# Patient Record
Sex: FEMALE | Race: WHITE | NOT HISPANIC OR LATINO | Employment: OTHER | URBAN - METROPOLITAN AREA
[De-identification: names, ages, dates, MRNs, and addresses within clinical notes are randomized per-mention and may not be internally consistent; named-entity substitution may affect disease eponyms.]

---

## 2017-01-19 ENCOUNTER — OFFICE VISIT (OUTPATIENT)
Dept: NEUROSURGERY | Facility: CLINIC | Age: 42
End: 2017-01-19

## 2017-01-19 VITALS
HEIGHT: 67 IN | TEMPERATURE: 98.2 F | BODY MASS INDEX: 32.68 KG/M2 | WEIGHT: 208.2 LBS | SYSTOLIC BLOOD PRESSURE: 120 MMHG | DIASTOLIC BLOOD PRESSURE: 76 MMHG

## 2017-01-19 DIAGNOSIS — M51.36 DEGENERATIVE DISC DISEASE, LUMBAR: Primary | ICD-10-CM

## 2017-01-19 DIAGNOSIS — M51.36 BULGE OF LUMBAR DISC WITHOUT MYELOPATHY: ICD-10-CM

## 2017-01-19 PROCEDURE — 99203 OFFICE O/P NEW LOW 30 MIN: CPT | Performed by: NEUROLOGICAL SURGERY

## 2017-01-19 RX ORDER — LOSARTAN POTASSIUM 100 MG/1
100 TABLET ORAL DAILY
COMMUNITY

## 2017-01-19 RX ORDER — GABAPENTIN 800 MG/1
800 TABLET ORAL 4 TIMES DAILY
COMMUNITY

## 2017-01-19 RX ORDER — PHENTERMINE HYDROCHLORIDE 37.5 MG/1
37.5 CAPSULE ORAL EVERY MORNING
COMMUNITY
End: 2019-01-21 | Stop reason: ALTCHOICE

## 2017-01-19 RX ORDER — BUPRENORPHINE HYDROCHLORIDE AND NALOXONE HYDROCHLORIDE DIHYDRATE 8; 2 MG/1; MG/1
1 TABLET SUBLINGUAL DAILY
COMMUNITY

## 2017-01-19 RX ORDER — CITALOPRAM 40 MG/1
40 TABLET ORAL DAILY
COMMUNITY
End: 2019-01-21 | Stop reason: ALTCHOICE

## 2017-01-19 RX ORDER — METHOCARBAMOL 750 MG/1
750 TABLET, FILM COATED ORAL 3 TIMES DAILY
Qty: 90 TABLET | Refills: 0 | Status: SHIPPED | OUTPATIENT
Start: 2017-01-19 | End: 2017-02-18

## 2017-01-19 RX ORDER — PROMETHAZINE HYDROCHLORIDE 25 MG/1
25 TABLET ORAL EVERY 6 HOURS PRN
COMMUNITY

## 2017-01-19 RX ORDER — OMEPRAZOLE 40 MG/1
40 CAPSULE, DELAYED RELEASE ORAL 2 TIMES DAILY
COMMUNITY
End: 2021-02-19

## 2017-01-19 RX ORDER — POLYETHYLENE GLYCOL 3350 17 G/17G
17 POWDER, FOR SOLUTION ORAL DAILY
COMMUNITY

## 2017-01-19 RX ORDER — METOCLOPRAMIDE 10 MG/1
10 TABLET ORAL
COMMUNITY

## 2017-01-19 RX ORDER — LORATADINE 10 MG/1
CAPSULE, LIQUID FILLED ORAL
COMMUNITY
End: 2019-01-21 | Stop reason: ALTCHOICE

## 2017-01-19 RX ORDER — SUMATRIPTAN 100 MG/1
100 TABLET, FILM COATED ORAL ONCE AS NEEDED
COMMUNITY

## 2017-01-19 NOTE — LETTER
2017     Demario Guajardo MD  [address] [place] [address]    Patient: Radha Rahman   YOB: 1975   Date of Visit: 2017       Dear Dr. Casandra MD:    Radha Rahman was in my office today. Below is a copy of my note.    If you have questions, please do not hesitate to call me. I look forward to following Radha along with you.         Sincerely,        Isidoro Hagen MD        CC: MD Elan Nuñez APRN Oliver C. James II, MD Ronald Keith Belhasen, MD Rebkami Galanr  1975  [id number]      Chief Complaint   Patient presents with   • Back Pain       HISTORY OF PRESENT ILLNESS:  [This is a 41-year-old female with a long history of low back pain.  The pain is located across the sacroiliac area.  She has some radiculopathy however it is ill-defined nondermatomal and not that usually associated with nerve root or spinal cord compression.  She had a fracture of her left ankle many years ago and has a abnormal gait which clearly is transmitted to the lumbar spine.  In the past she has been to pain management with epidural steroid injections which helped.  She has not had that for some time until she regained her insurance under the Affordable Care Act.    MRI was performed and she is referred for neurosurgical consultation. ]    Past Medical History   Diagnosis Date   • Arthritis    • Wade syndrome    • Bladder infection    • Bronchitis    • Emphysema lung    • GERD (gastroesophageal reflux disease)    • Hernia    • Hypertension    • Infectious viral hepatitis    • Ulcer        Past Surgical History   Procedure Laterality Date   •  section     • Gallbladder surgery     • Ercp         Family History   Problem Relation Age of Onset   • Cancer Mother    • Arthritis Mother    • Heart disease Father    • Cancer Father        Social History     Social History   • Marital status: Unknown     Spouse name: N/A   • Number of children:  N/A   • Years of education: N/A     Occupational History   • Not on file.     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    • Sexual activity: Defer     Other Topics Concern   • Not on file     Social History Narrative   • No narrative on file       Allergies   Allergen Reactions   • Bactrim [Sulfamethoxazole-Trimethoprim]    • Stadol [Butorphanol]          Current Outpatient Prescriptions:   •  buprenorphine-naloxone (SUBOXONE) 8-2 MG per SL tablet, Place 1 tablet under the tongue Daily., Disp: , Rfl:   •  citalopram (CeleXA) 40 MG tablet, Take 40 mg by mouth Daily., Disp: , Rfl:   •  CLINDAMYCIN HCL PO, Take  by mouth., Disp: , Rfl:   •  diclofenac (VOLTAREN) 1 % gel gel, Apply 4 g topically 4 (Four) Times a Day As Needed., Disp: , Rfl:   •  gabapentin (NEURONTIN) 800 MG tablet, Take 800 mg by mouth 4 (Four) Times a Day., Disp: , Rfl:   •  Loratadine 10 MG capsule, Take  by mouth., Disp: , Rfl:   •  losartan (COZAAR) 100 MG tablet, Take 100 mg by mouth Daily., Disp: , Rfl:   •  metoclopramide (REGLAN) 10 MG tablet, Take 10 mg by mouth 4 (Four) Times a Day Before Meals & at Bedtime., Disp: , Rfl:   •  omeprazole (priLOSEC) 40 MG capsule, Take 40 mg by mouth 2 (Two) Times a Day., Disp: , Rfl:   •  phentermine 37.5 MG capsule, Take 37.5 mg by mouth Every Morning., Disp: , Rfl:   •  polyethylene glycol (MIRALAX) packet, Take 17 g by mouth Daily., Disp: , Rfl:   •  promethazine (PHENERGAN) 25 MG tablet, Take 25 mg by mouth Every 6 (Six) Hours As Needed for nausea or vomiting., Disp: , Rfl:   •  SUMAtriptan (IMITREX) 100 MG tablet, Take 100 mg by mouth 1 (One) Time As Needed for migraine., Disp: , Rfl:     Review of Systems   Constitutional: Positive for activity change, fatigue and unexpected weight change. Negative for  appetite change, chills, diaphoresis and fever.   HENT: Positive for congestion, ear pain, postnasal drip, rhinorrhea, sinus pressure and trouble swallowing. Negative for dental problem, drooling, ear discharge, facial swelling, hearing loss, mouth sores, nosebleeds, sneezing, sore throat, tinnitus and voice change.    Eyes: Positive for photophobia, itching and visual disturbance. Negative for pain, discharge and redness.   Respiratory: Positive for cough, chest tightness and shortness of breath. Negative for apnea, choking, wheezing and stridor.    Cardiovascular: Positive for chest pain, palpitations and leg swelling.   Gastrointestinal: Positive for abdominal distention, abdominal pain, anal bleeding, blood in stool, constipation, diarrhea, nausea, rectal pain and vomiting.   Endocrine: Positive for cold intolerance. Negative for heat intolerance, polydipsia, polyphagia and polyuria.   Genitourinary: Positive for dyspareunia, flank pain, menstrual problem and pelvic pain. Negative for decreased urine volume, difficulty urinating, dysuria, enuresis, frequency, genital sores, hematuria and urgency.   Musculoskeletal: Positive for arthralgias, back pain, gait problem, joint swelling, myalgias, neck pain and neck stiffness.   Skin: Negative for color change, pallor, rash and wound.   Allergic/Immunologic: Negative for environmental allergies, food allergies and immunocompromised state.   Neurological: Positive for dizziness, weakness and numbness. Negative for tremors, seizures, syncope, facial asymmetry, speech difficulty, light-headedness and headaches.   Hematological: Positive for adenopathy. Bruises/bleeds easily (anemic).   Psychiatric/Behavioral: Positive for agitation, decreased concentration, dysphoric mood and sleep disturbance. Negative for behavioral problems, confusion, hallucinations, self-injury and suicidal ideas. The patient is nervous/anxious. The patient is not hyperactive.    All other systems  "reviewed and are negative.      Neurological Examination:    Vitals:    01/19/17 1256   BP: 120/76   BP Location: Right arm   Patient Position: Sitting   Temp: 98.2 °F (36.8 °C)   TempSrc: Temporal Artery    Weight: 208 lb 3.2 oz (94.4 kg)   Height: 67\" (170.2 cm)       Mental status/speech: The patient is alert and oriented.  Speech is clear without aphysia or dysarthria.  No overt cognitive deficits.    Cranial nerve examination:    Olfaction: Smell is intact.  Vision: Vision is intact without visual field abnormalities.  Funduscopic examination is normal.  No pupillary irregularity.  Ocular motor examination: The extraocular muscles are intact.  There is no diplopia.  The pupil is round and reactive to both light and accommodation.  There is no nystagmus.  Facial movement/sensation: There is no facial weakness.  Sensation is intact in the first, second, and third divisions of the trigeminal nerve.  The corneal reflex is intact.  Auditory: Hearing is intact to finger rub bilaterally.  Cranial nerves IX, X, XI, XII: Phonation is normal.  No dysphagia.  Tongue is protruded in the midline without atrophy.  The gag reflex is intact.  Shoulder shrug is normal.    Musculoligamentous ligamentous examination: She has limitation range of motion.  However, straight leg raising, Posey and flip test are all within normal limits.  Furthermore I find no evidence of weakness, sensory loss or reflex asymmetry.  She does have an ankle deformity with \"fallen arches\" on her left only.    Medical Decision Making:     Diagnostic Data Set:  Lumbar MRI shows degenerative disc disease L5/S1      Assessment:  Degenerative disc disease          Recommendations:  She does not have a condition that would be helped with surgery.  I have suggested that she see an orthopedist concerning her ankle to see if that can be reconstructed or if she can be given the appropriate corrective braces.        I greatly appreciate the opportunity to see and " evaluate this individual.  If you have questions or concerns regarding issues that I may have overlooked please call me at any time: [phone].  Ha Hagen M.D.  [facility]  [address] [place] [address]    Scribed for Isidoro Hagen MD by Naomi Caldwell CMA. 1/19/2017  1:30 PM    I have read and concur with the information provided by the scribe.

## 2017-01-19 NOTE — MR AVS SNAPSHOT
Radha Rahman   1/19/2017 12:30 PM   Office Visit    Dept Phone:  [phone]   Encounter #:  [id number]    Provider:  Isidoro Hagen MD   Department:  [facility]                Your Full Care Plan              Today's Medication Changes          These changes are accurate as of: 1/19/17  1:36 PM.  If you have any questions, ask your nurse or doctor.               New Medication(s)Ordered:     methocarbamol 750 MG tablet   Commonly known as:  ROBAXIN   Take 1 tablet by mouth 3 (Three) Times a Day for 30 days.   Started by:  Isidoro Hagen MD            Where to Get Your Medications      These medications were sent to [place], [place] - [address] - [phone] [facility] [phone]   [address], [facility] [address]     Phone:  [phone]     methocarbamol 750 MG tablet                  Your Updated Medication List          This list is accurate as of: 1/19/17  1:36 PM.  Always use your most recent med list.                buprenorphine-naloxone 8-2 MG per SL tablet   Commonly known as:  SUBOXONE       citalopram 40 MG tablet   Commonly known as:  CeleXA       CLINDAMYCIN HCL PO       diclofenac 1 % gel gel   Commonly known as:  VOLTAREN       gabapentin 800 MG tablet   Commonly known as:  NEURONTIN       Loratadine 10 MG capsule       losartan 100 MG tablet   Commonly known as:  COZAAR       methocarbamol 750 MG tablet   Commonly known as:  ROBAXIN   Take 1 tablet by mouth 3 (Three) Times a Day for 30 days.       metoclopramide 10 MG tablet   Commonly known as:  REGLAN       omeprazole 40 MG capsule   Commonly known as:  priLOSEC       phentermine 37.5 MG capsule       polyethylene glycol packet   Commonly known as:  MIRALAX       promethazine 25 MG tablet   Commonly known as:  PHENERGAN       SUMAtriptan 100 MG tablet   Commonly known as:  IMITREX               We Performed the Following     Ambulatory Referral  "to Pain Management     SCANNED - IMAGING       You Were Diagnosed With        Codes Comments    Degenerative disc disease, lumbar    -  Primary ICD-10-CM: M51.36  ICD-9-CM: 722.52 L5/S1    Bulge of lumbar disc without myelopathy     ICD-10-CM: M51.26  ICD-9-CM: 722.10       Instructions     None    Patient Instructions History      Upcoming Appointments     Visit Type Date Time Department    NEW PATIENT 2017 12:30 PM [facility]      [facility] Signup     [facility] allows you to send messages to your doctor, view your test results, renew your prescriptions, schedule appointments, and more. To sign up, go to [employer] and click on the Sign Up Now link in the New User? box. Enter your [facility] Activation Code exactly as it appears below along with the last four digits of your Social Security Number and your Date of Birth () to complete the sign-up process. If you do not sign up before the expiration date, you must request a new code.    [facility] Activation Code: [email or web address]  Expires: 2017  1:33 PM    If you have questions, you can email [email or web address] or call [phone] to talk to our [facility] staff. Remember, [facility] is NOT to be used for urgent needs. For medical emergencies, dial 911.               Other Info from Your Visit           Allergies     Bactrim [Sulfamethoxazole-trimethoprim]      Stadol [Butorphanol]        Reason for Visit     Back Pain           Vital Signs     Blood Pressure Temperature Height    120/76 (BP Location: Right arm, Patient Position: Sitting) 98.2 °F (36.8 °C) (Temporal Artery ) 67\" (170.2 cm)    Weight Body Mass Index Smoking Status    208 lb 3.2 oz (94.4 kg) 32.61 kg/m2 Current Every Day Smoker      Problems and Diagnoses Noted     Degeneration of lumbar or lumbosacral intervertebral disc    -  Primary    Bulge of lumbar disc without myelopathy          Results     SCANNED - IMAGING               "

## 2017-01-19 NOTE — PROGRESS NOTES
Radha Pachecoyner  1975  [id number]      Chief Complaint   Patient presents with   • Back Pain       HISTORY OF PRESENT ILLNESS:  [This is a 41-year-old female with a long history of low back pain.  The pain is located across the sacroiliac area.  She has some radiculopathy however it is ill-defined nondermatomal and not that usually associated with nerve root or spinal cord compression.  She had a fracture of her left ankle many years ago and has a abnormal gait which clearly is transmitted to the lumbar spine.  In the past she has been to pain management with epidural steroid injections which helped.  She has not had that for some time until she regained her insurance under the Affordable Care Act.    MRI was performed and she is referred for neurosurgical consultation. ]    Past Medical History   Diagnosis Date   • Arthritis    • Wade syndrome    • Bladder infection    • Bronchitis    • Emphysema lung    • GERD (gastroesophageal reflux disease)    • Hernia    • Hypertension    • Infectious viral hepatitis    • Ulcer        Past Surgical History   Procedure Laterality Date   •  section     • Gallbladder surgery     • Ercp         Family History   Problem Relation Age of Onset   • Cancer Mother    • Arthritis Mother    • Heart disease Father    • Cancer Father        Social History     Social History   • Marital status: Unknown     Spouse name: N/A   • Number of children: N/A   • Years of education: N/A     Occupational History   • Not on file.     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    • Sexual activity: Defer     Other Topics Concern   • Not on file     Social History Narrative   • No narrative on file       Allergies   Allergen Reactions   • Bactrim [Sulfamethoxazole-Trimethoprim]    • Stadol  [Butorphanol]          Current Outpatient Prescriptions:   •  buprenorphine-naloxone (SUBOXONE) 8-2 MG per SL tablet, Place 1 tablet under the tongue Daily., Disp: , Rfl:   •  citalopram (CeleXA) 40 MG tablet, Take 40 mg by mouth Daily., Disp: , Rfl:   •  CLINDAMYCIN HCL PO, Take  by mouth., Disp: , Rfl:   •  diclofenac (VOLTAREN) 1 % gel gel, Apply 4 g topically 4 (Four) Times a Day As Needed., Disp: , Rfl:   •  gabapentin (NEURONTIN) 800 MG tablet, Take 800 mg by mouth 4 (Four) Times a Day., Disp: , Rfl:   •  Loratadine 10 MG capsule, Take  by mouth., Disp: , Rfl:   •  losartan (COZAAR) 100 MG tablet, Take 100 mg by mouth Daily., Disp: , Rfl:   •  metoclopramide (REGLAN) 10 MG tablet, Take 10 mg by mouth 4 (Four) Times a Day Before Meals & at Bedtime., Disp: , Rfl:   •  omeprazole (priLOSEC) 40 MG capsule, Take 40 mg by mouth 2 (Two) Times a Day., Disp: , Rfl:   •  phentermine 37.5 MG capsule, Take 37.5 mg by mouth Every Morning., Disp: , Rfl:   •  polyethylene glycol (MIRALAX) packet, Take 17 g by mouth Daily., Disp: , Rfl:   •  promethazine (PHENERGAN) 25 MG tablet, Take 25 mg by mouth Every 6 (Six) Hours As Needed for nausea or vomiting., Disp: , Rfl:   •  SUMAtriptan (IMITREX) 100 MG tablet, Take 100 mg by mouth 1 (One) Time As Needed for migraine., Disp: , Rfl:     Review of Systems   Constitutional: Positive for activity change, fatigue and unexpected weight change. Negative for appetite change, chills, diaphoresis and fever.   HENT: Positive for congestion, ear pain, postnasal drip, rhinorrhea, sinus pressure and trouble swallowing. Negative for dental problem, drooling, ear discharge, facial swelling, hearing loss, mouth sores, nosebleeds, sneezing, sore throat, tinnitus and voice change.    Eyes: Positive for photophobia, itching and visual disturbance. Negative for pain, discharge and redness.   Respiratory: Positive for cough, chest tightness and shortness of breath. Negative for apnea, choking,  "wheezing and stridor.    Cardiovascular: Positive for chest pain, palpitations and leg swelling.   Gastrointestinal: Positive for abdominal distention, abdominal pain, anal bleeding, blood in stool, constipation, diarrhea, nausea, rectal pain and vomiting.   Endocrine: Positive for cold intolerance. Negative for heat intolerance, polydipsia, polyphagia and polyuria.   Genitourinary: Positive for dyspareunia, flank pain, menstrual problem and pelvic pain. Negative for decreased urine volume, difficulty urinating, dysuria, enuresis, frequency, genital sores, hematuria and urgency.   Musculoskeletal: Positive for arthralgias, back pain, gait problem, joint swelling, myalgias, neck pain and neck stiffness.   Skin: Negative for color change, pallor, rash and wound.   Allergic/Immunologic: Negative for environmental allergies, food allergies and immunocompromised state.   Neurological: Positive for dizziness, weakness and numbness. Negative for tremors, seizures, syncope, facial asymmetry, speech difficulty, light-headedness and headaches.   Hematological: Positive for adenopathy. Bruises/bleeds easily (anemic).   Psychiatric/Behavioral: Positive for agitation, decreased concentration, dysphoric mood and sleep disturbance. Negative for behavioral problems, confusion, hallucinations, self-injury and suicidal ideas. The patient is nervous/anxious. The patient is not hyperactive.    All other systems reviewed and are negative.      Neurological Examination:    Vitals:    01/19/17 1256   BP: 120/76   BP Location: Right arm   Patient Position: Sitting   Temp: 98.2 °F (36.8 °C)   TempSrc: Temporal Artery    Weight: 208 lb 3.2 oz (94.4 kg)   Height: 67\" (170.2 cm)       Mental status/speech: The patient is alert and oriented.  Speech is clear without aphysia or dysarthria.  No overt cognitive deficits.    Cranial nerve examination:    Olfaction: Smell is intact.  Vision: Vision is intact without visual field abnormalities.  " "Funduscopic examination is normal.  No pupillary irregularity.  Ocular motor examination: The extraocular muscles are intact.  There is no diplopia.  The pupil is round and reactive to both light and accommodation.  There is no nystagmus.  Facial movement/sensation: There is no facial weakness.  Sensation is intact in the first, second, and third divisions of the trigeminal nerve.  The corneal reflex is intact.  Auditory: Hearing is intact to finger rub bilaterally.  Cranial nerves IX, X, XI, XII: Phonation is normal.  No dysphagia.  Tongue is protruded in the midline without atrophy.  The gag reflex is intact.  Shoulder shrug is normal.    Musculoligamentous ligamentous examination: She has limitation range of motion.  However, straight leg raising, Paige and flip test are all within normal limits.  Furthermore I find no evidence of weakness, sensory loss or reflex asymmetry.  She does have an ankle deformity with \"fallen arches\" on her left only.    Medical Decision Making:     Diagnostic Data Set:  Lumbar MRI shows degenerative disc disease L5/S1      Assessment:  Degenerative disc disease          Recommendations:  She does not have a condition that would be helped with surgery.  I have suggested that she see an orthopedist concerning her ankle to see if that can be reconstructed or if she can be given the appropriate corrective braces.        I greatly appreciate the opportunity to see and evaluate this individual.  If you have questions or concerns regarding issues that I may have overlooked please call me at any time: [phone].  Ha Hagen M.D.  [facility]  [address] [place] [address].  [place] [address]    Scribed for Isidoro Hagen MD by Naomi Caldwell CMA. 1/19/2017  1:30 PM    I have read and concur with the information provided by the scribe.   "

## 2017-03-30 RX ORDER — METHOCARBAMOL 750 MG/1
750 TABLET, FILM COATED ORAL 3 TIMES DAILY
Qty: 90 TABLET | Refills: 0 | Status: SHIPPED | OUTPATIENT
Start: 2017-03-30 | End: 2019-01-21 | Stop reason: ALTCHOICE

## 2017-03-30 NOTE — TELEPHONE ENCOUNTER
Provider:  Hieu  Caller: self  Time of call: 1124    Phone #:  [phone]  Surgery:  no  Surgery Date: no   Last visit:   1/19/17  Next visit: none    ANTONY:         Reason for call:         ----- Message from Minerva Kramer sent at 3/30/2017 11:24 AM EDT -----  Contact: [phone]  PATIENT CALLING REQUESTING A REFILL DOMI Moe     [facility] [phone]

## 2018-07-27 DIAGNOSIS — M25.572 LEFT ANKLE PAIN, UNSPECIFIED CHRONICITY: Primary | ICD-10-CM

## 2018-08-17 ENCOUNTER — LAB REQUISITION (OUTPATIENT)
Dept: LAB | Facility: HOSPITAL | Age: 43
End: 2018-08-17

## 2018-08-17 DIAGNOSIS — R10.9 ABDOMINAL PAIN: ICD-10-CM

## 2018-08-17 DIAGNOSIS — M25.572 LEFT ANKLE PAIN, UNSPECIFIED CHRONICITY: Primary | ICD-10-CM

## 2018-08-17 PROCEDURE — 88305 TISSUE EXAM BY PATHOLOGIST: CPT | Performed by: INTERNAL MEDICINE

## 2018-08-27 LAB
CYTO UR: NORMAL
LAB AP CASE REPORT: NORMAL
LAB AP CLINICAL INFORMATION: NORMAL
PATH REPORT.FINAL DX SPEC: NORMAL
PATH REPORT.GROSS SPEC: NORMAL

## 2019-01-21 ENCOUNTER — OFFICE VISIT (OUTPATIENT)
Dept: GASTROENTEROLOGY | Facility: CLINIC | Age: 44
End: 2019-01-21

## 2019-01-21 VITALS
BODY MASS INDEX: 34.88 KG/M2 | HEIGHT: 67 IN | HEART RATE: 74 BPM | DIASTOLIC BLOOD PRESSURE: 84 MMHG | WEIGHT: 222.2 LBS | SYSTOLIC BLOOD PRESSURE: 126 MMHG

## 2019-01-21 DIAGNOSIS — Z87.19 HISTORY OF PYLORIC STENOSIS: ICD-10-CM

## 2019-01-21 DIAGNOSIS — R13.19 ESOPHAGEAL DYSPHAGIA: ICD-10-CM

## 2019-01-21 DIAGNOSIS — E66.09 CLASS 1 OBESITY DUE TO EXCESS CALORIES WITHOUT SERIOUS COMORBIDITY WITH BODY MASS INDEX (BMI) OF 34.0 TO 34.9 IN ADULT: ICD-10-CM

## 2019-01-21 DIAGNOSIS — K21.9 GASTROESOPHAGEAL REFLUX DISEASE WITHOUT ESOPHAGITIS: ICD-10-CM

## 2019-01-21 DIAGNOSIS — Z87.19 HISTORY OF PYLORIC CHANNEL ULCER: ICD-10-CM

## 2019-01-21 DIAGNOSIS — K31.84 GASTROPARESIS: Primary | ICD-10-CM

## 2019-01-21 PROBLEM — E66.811 CLASS 1 OBESITY DUE TO EXCESS CALORIES WITHOUT SERIOUS COMORBIDITY WITH BODY MASS INDEX (BMI) OF 34.0 TO 34.9 IN ADULT: Status: ACTIVE | Noted: 2019-01-21

## 2019-01-21 PROCEDURE — 99213 OFFICE O/P EST LOW 20 MIN: CPT | Performed by: INTERNAL MEDICINE

## 2019-01-21 RX ORDER — METOLAZONE 2.5 MG/1
TABLET ORAL
COMMUNITY
Start: 2019-01-05 | End: 2021-04-15

## 2019-01-21 RX ORDER — ONDANSETRON 8 MG/1
TABLET, ORALLY DISINTEGRATING ORAL
COMMUNITY
Start: 2018-12-18

## 2019-01-21 RX ORDER — IMIQUIMOD 12.5 MG/.25G
CREAM TOPICAL
COMMUNITY
Start: 2019-01-02

## 2019-01-21 RX ORDER — PROPRANOLOL HYDROCHLORIDE 80 MG/1
CAPSULE, EXTENDED RELEASE ORAL
COMMUNITY
Start: 2019-01-05 | End: 2021-04-15

## 2019-01-21 RX ORDER — TRIAMCINOLONE ACETONIDE 55 UG/1
SPRAY, METERED NASAL
COMMUNITY
Start: 2019-01-02

## 2019-01-21 RX ORDER — BUPROPION HYDROCHLORIDE 300 MG/1
TABLET ORAL
COMMUNITY
Start: 2019-01-02 | End: 2020-12-08

## 2019-01-21 RX ORDER — ALBUTEROL SULFATE 90 UG/1
AEROSOL, METERED RESPIRATORY (INHALATION)
COMMUNITY
Start: 2019-01-02

## 2019-01-21 RX ORDER — HYDROXYZINE 50 MG/1
TABLET, FILM COATED ORAL
COMMUNITY
Start: 2019-01-02

## 2019-01-21 NOTE — PROGRESS NOTES
GASTROENTEROLOGY OFFICE NOTE  Radha Rahman  [id number]  1975    CARE TEAM  Patient Care Team:  Demario Guajardo MD as PCP - General (Internal Medicine)  Elan Krause APRN as Referring Physician (Family Medicine)    No ref. provider found     Chief Complaint   Patient presents with   • Follow-up     Lab results , EGD for dysphagia, pyloric channel ulcer, duodenal stricture        HISTORY OF PRESENT ILLNESS:  Patient presents for follow-up after recent EGD stating that her dysphagia has improved.  She is complaining of a burning sensation which happens about 2 times per week but for the most part she is doing better.  She's had abnormal liver enzymes which are now normal.  She's had pyloric stenosis in the past which has required balloon dilation.    She is here today for the most part doing fairly well.  Previously her main complaint was that of worsening gastroesophageal reflux disease except for the occasional breakthrough symptoms that she's having she feels this is doing better    In terms of her pyloric stenosis she does not have the bloating and discomfort that have characterize her when the pyloric stenosis is required dilation.    She does have nonalcoholic steatohepatitis but her most recent liver enzymes were within normal limits.    Wade's esophagus was  identified on her recent EGD on 2018.    PAST MEDICAL HISTORY  Past Medical History:   Diagnosis Date   • Arthritis    • Wade esophagus    • Wade syndrome    • Bladder infection    • Bronchitis    • Emphysema lung (CMS/HCC)    • Fibromyalgia    • GERD (gastroesophageal reflux disease)    • Hernia    • Hiatal hernia    • Hypertension    • Infectious viral hepatitis    • Migraines    • Ulcer         PAST SURGICAL HISTORY  Past Surgical History:   Procedure Laterality Date   •  SECTION     • ERCP     • GALLBLADDER SURGERY          MEDICATIONS:    Current Outpatient Medications:   •  buprenorphine-naloxone (SUBOXONE) 8-2  MG per SL tablet, Place 1 tablet under the tongue Daily., Disp: , Rfl:   •  buPROPion XL (WELLBUTRIN XL) 300 MG 24 hr tablet, , Disp: , Rfl:   •  gabapentin (NEURONTIN) 800 MG tablet, Take 800 mg by mouth 4 (Four) Times a Day., Disp: , Rfl:   •  hydrOXYzine (ATARAX) 50 MG tablet, , Disp: , Rfl:   •  imiquimod (ALDARA) 5 % cream, , Disp: , Rfl:   •  losartan (COZAAR) 100 MG tablet, Take 100 mg by mouth Daily., Disp: , Rfl:   •  metoclopramide (REGLAN) 10 MG tablet, Take 10 mg by mouth 4 (Four) Times a Day Before Meals & at Bedtime., Disp: , Rfl:   •  metOLazone (ZAROXOLYN) 2.5 MG tablet, , Disp: , Rfl:   •  NASACORT ALLERGY 24HR 55 MCG/ACT nasal inhaler, , Disp: , Rfl:   •  omeprazole (priLOSEC) 40 MG capsule, Take 40 mg by mouth 2 (Two) Times a Day., Disp: , Rfl:   •  ondansetron ODT (ZOFRAN-ODT) 8 MG disintegrating tablet, , Disp: , Rfl:   •  polyethylene glycol (MIRALAX) packet, Take 17 g by mouth Daily., Disp: , Rfl:   •  promethazine (PHENERGAN) 25 MG tablet, Take 25 mg by mouth Every 6 (Six) Hours As Needed for nausea or vomiting., Disp: , Rfl:   •  propranolol LA (INDERAL LA) 80 MG 24 hr capsule, , Disp: , Rfl:   •  SUMAtriptan (IMITREX) 100 MG tablet, Take 100 mg by mouth 1 (One) Time As Needed for migraine., Disp: , Rfl:   •  VENTOLIN  (90 Base) MCG/ACT inhaler, , Disp: , Rfl:     ALLERGIES  Allergies   Allergen Reactions   • Bactrim [Sulfamethoxazole-Trimethoprim]    • Stadol [Butorphanol]        FAMILY HISTORY:  Family History   Problem Relation Age of Onset   • Cancer Mother    • Arthritis Mother    • Heart disease Father    • Cancer Father    • Diabetes Father    • Diabetes Paternal Grandmother        SOCIAL HISTORY  Social History     Socioeconomic History   • Marital status: Unknown     Spouse name: Not on file   • Number of children: Not on file   • Years of education: Not on file   • Highest education level: Not on file   Tobacco Use   • Smoking status: Current Every Day Smoker     Packs/day:  "1.00     Types: Cigarettes, Electronic Cigarette   • Smokeless tobacco: Never Used   Substance and Sexual Activity   • Alcohol use: No     Frequency: Never   • Drug use: Yes     Types: Heroin     Comment: 01/2011   • Sexual activity: Defer     Socioeconomic History: She is currently living with her parents    .  She does not abuse alcohol and she does smoke cigarettes.      REVIEW OF SYSTEMS  Review of Systems   Constitutional: Negative for unexpected weight loss.   HENT: Positive for trouble swallowing.    Eyes: Negative.    Respiratory: Negative.    Gastrointestinal: Positive for abdominal distention, abdominal pain, anal bleeding, constipation, diarrhea, nausea, rectal pain, vomiting, GERD and indigestion. Negative for blood in stool.   Endocrine: Negative.    Genitourinary: Negative.    Musculoskeletal: Negative.    Skin: Negative.    Allergic/Immunologic: Negative.    Neurological: Negative.    Hematological: Negative.    Psychiatric/Behavioral: Negative.        PHYSICAL EXAM   /84 (BP Location: Right arm, Patient Position: Sitting, Cuff Size: Adult)   Pulse 74   Ht 170.2 cm (67\")   Wt 101 kg (222 lb 3.2 oz)   BMI 34.80 kg/m²   General: Alert and oriented x 3. In no apparent or acute distress.  and No stigmata of chronic liver disease  HEENT: Anicteric slcera. Normal oropharynx  Neck: Supple. Without lymphadenopathy  CV: Regular rate and rhythm, S1, S2  Lungs: Clear to ausculation. Without rales, robchi and wheezing  Abdomen:  Soft,non-distended without palpable masses or hepatosplenomeagaly, areas of rebound tenderness or guarding.   Extremeties: without clubbing, cyanosis or edema  Neurologic:  Alert and oriented x 3 without focal motor or sensory deficits  Rectal exam: deferred     Results for orders placed or performed in visit on 08/17/18   Tissue Pathology Exam   Result Value Ref Range    Case Report       Surgical Pathology Report                         Case: [id number]                         "          Authorizing Provider:  Gonzalez Jad     Collected:           08/17/2018 12:00 PM                                 MD Mike                                                                    Pathologist:           Nabeel Sigala MD         Received:            08/17/2018 01:00 PM          Specimen:    Gastric, Antrum                                                                            Clinical Information       The working history is abdominal pain, dysphagia, Wade's esophagus status post esophageal dilation to 20 mm, hiatal hernia, gastroparesis, pyloric channel ulcer, biopsied, and duodenal stricture dilated to 12 mm.      Final Diagnosis        GASTRIC ANTRUM BIOPSY:  Superficial mucosal erosion with marked epithelial reactive/regenerative atypia, mild gastritis and goblet cell intestinal metaplasia.  No dysplasia identified.    DGD/dlb        Gross Description       Received in formalin labeled as antrum biopsy is a 0.8 x 0.2 x 0.2 cm aggregate of pink tissue fragment submitted entirely in block 1-A.  LED/dlb        Microscopic Description       Sections of the gastric mucosal biopsies show focal superficial erosion with regenerative/reactive atypia of the superficial mucosal epithelium and goblet cell intestinal metaplasia.  There is a very slight degree of chronic inflammation in the lamina propria.  No dysplastic features are identified.            Results Review:  I reviewed the patient's new clinical results.      ASSESSMENT  1.-Chronic gastroesophageal reflux disease.  Occasional breakthrough symptoms  2.-Wade's esophagus  3.-History of pyloric stenosis requiring dilation  4.-Nonalcoholic steatohepatitis  5.-Fibromyalgia  6.-Dysphagia to solids resolved following recent esophageal dilation.    PLAN  1.-GI cocktail when necessary for breakthrough symptoms (Maalox plus viscous lidocaine plus Donnatal elixir)  2.-Pepcid complete when necessary for breakthrough  symptoms  3.-Repeat esophageal dilation as needed  4.-Weight loss is encouraged  5.-Repeat EGD in 2020.      I discussed the patients findings and my recommendations with patient    Jad Gonzalez MD  1/21/2019   4:45 PM    Much of this note is an electronic transcription of spoken language to printed text. Electronic transcription of spoken language may permit erroneous, nonsensical word phrases to be inadvertently transcribed.  Although I have reviewed the note for these errors, some may still be present.

## 2019-05-23 ENCOUNTER — TELEPHONE (OUTPATIENT)
Dept: GASTROENTEROLOGY | Facility: CLINIC | Age: 44
End: 2019-05-23

## 2020-12-08 ENCOUNTER — OFFICE VISIT (OUTPATIENT)
Dept: NEUROSURGERY | Facility: CLINIC | Age: 45
End: 2020-12-08

## 2020-12-08 VITALS
BODY MASS INDEX: 38.52 KG/M2 | HEIGHT: 67 IN | SYSTOLIC BLOOD PRESSURE: 142 MMHG | DIASTOLIC BLOOD PRESSURE: 84 MMHG | WEIGHT: 245.4 LBS

## 2020-12-08 DIAGNOSIS — M51.36 DEGENERATIVE DISC DISEASE, LUMBAR: ICD-10-CM

## 2020-12-08 DIAGNOSIS — M54.2 NECK PAIN: Primary | ICD-10-CM

## 2020-12-08 DIAGNOSIS — M50.30 DDD (DEGENERATIVE DISC DISEASE), CERVICAL: ICD-10-CM

## 2020-12-08 PROCEDURE — 99203 OFFICE O/P NEW LOW 30 MIN: CPT | Performed by: NEUROLOGICAL SURGERY

## 2020-12-08 RX ORDER — METHOCARBAMOL 750 MG/1
750 TABLET, FILM COATED ORAL NIGHTLY
Qty: 30 TABLET | Refills: 0 | Status: CANCELLED | OUTPATIENT
Start: 2020-12-08 | End: 2021-01-07

## 2020-12-08 RX ORDER — TRAZODONE HYDROCHLORIDE 150 MG/1
TABLET ORAL
COMMUNITY
Start: 2020-11-24 | End: 2022-11-10

## 2020-12-08 RX ORDER — ARIPIPRAZOLE 15 MG/1
15 TABLET ORAL DAILY
COMMUNITY

## 2020-12-08 RX ORDER — BUSPIRONE HYDROCHLORIDE 15 MG/1
15 TABLET ORAL 2 TIMES DAILY
COMMUNITY

## 2020-12-08 RX ORDER — MIRTAZAPINE 45 MG/1
45 TABLET, ORALLY DISINTEGRATING ORAL NIGHTLY
COMMUNITY
End: 2022-11-10

## 2020-12-08 NOTE — PROGRESS NOTES
Radha Galanr  1975  [id number]      Chief Complaint   Patient presents with   • Back Pain       HISTORY OF PRESENT ILLNESS: This is a 45-year-old female seen with a chief complaint of severe back pain which radiates into both lower extremities more so on her left than the right and pain in the cervical area associated with severe headache rating to both shoulders.  We saw her approximately 2 to 3 years ago with diagnoses of degenerative osteoarthritis disc degeneration and moderate spinal stenosis at L5-S1.  Since that time she has been to pain management physical therapy without any improvement.  She currently is in a Suboxone clinic and receiving Botox injections which are somewhat helpful.  A lumbar MRI was performed and she is referred for neurosurgical consultation.    Past Medical History:   Diagnosis Date   • Arthritis    • Wade esophagus    • Wade syndrome    • Bladder infection    • Bronchitis    • Emphysema lung (CMS/HCC)    • Fibromyalgia    • GERD (gastroesophageal reflux disease)    • Hernia    • Hiatal hernia    • Hypertension    • Infectious viral hepatitis    • Migraines    • Ulcer        Past Surgical History:   Procedure Laterality Date   •  SECTION     • ERCP     • GALLBLADDER SURGERY         Family History   Problem Relation Age of Onset   • Cancer Mother    • Arthritis Mother    • Heart disease Father    • Cancer Father    • Diabetes Father    • Diabetes Paternal Grandmother        Social History     Socioeconomic History   • Marital status:      Spouse name: Not on file   • Number of children: Not on file   • Years of education: Not on file   • Highest education level: Not on file   Tobacco Use   • Smoking status: Current Every Day Smoker     Packs/day: 1.00     Types: Cigarettes, Electronic Cigarette   • Smokeless tobacco: Never Used   Substance and Sexual Activity   • Alcohol use: No     Frequency: Never   • Drug use: Yes     Types: Heroin     Comment: 2011   •  Sexual activity: Defer       Allergies   Allergen Reactions   • Bactrim [Sulfamethoxazole-Trimethoprim]    • Bupropion Other (See Comments)     seizures   • Stadol [Butorphanol]          Current Outpatient Medications:   •  ARIPiprazole (ABILIFY) 7.5 MG half tablet, Take 7.5 mg by mouth Daily., Disp: , Rfl:   •  buprenorphine-naloxone (SUBOXONE) 8-2 MG per SL tablet, Place 1 tablet under the tongue Daily., Disp: , Rfl:   •  busPIRone (BUSPAR) 15 MG tablet, Take 15 mg by mouth 2 (Two) Times a Day., Disp: , Rfl:   •  gabapentin (NEURONTIN) 800 MG tablet, Take 800 mg by mouth 4 (Four) Times a Day., Disp: , Rfl:   •  hydrOXYzine (ATARAX) 50 MG tablet, , Disp: , Rfl:   •  imiquimod (ALDARA) 5 % cream, , Disp: , Rfl:   •  losartan (COZAAR) 100 MG tablet, Take 100 mg by mouth Daily., Disp: , Rfl:   •  metoclopramide (REGLAN) 10 MG tablet, Take 10 mg by mouth 4 (Four) Times a Day Before Meals & at Bedtime., Disp: , Rfl:   •  mirtazapine (REMERON SOL-TAB) 45 MG disintegrating tablet, Place 45 mg on the tongue Every Night., Disp: , Rfl:   •  NASACORT ALLERGY 24HR 55 MCG/ACT nasal inhaler, , Disp: , Rfl:   •  omeprazole (priLOSEC) 40 MG capsule, Take 40 mg by mouth 2 (Two) Times a Day., Disp: , Rfl:   •  ondansetron ODT (ZOFRAN-ODT) 8 MG disintegrating tablet, , Disp: , Rfl:   •  polyethylene glycol (MIRALAX) packet, Take 17 g by mouth Daily., Disp: , Rfl:   •  promethazine (PHENERGAN) 25 MG tablet, Take 25 mg by mouth Every 6 (Six) Hours As Needed for nausea or vomiting., Disp: , Rfl:   •  propranolol LA (INDERAL LA) 80 MG 24 hr capsule, , Disp: , Rfl:   •  SUMAtriptan (IMITREX) 100 MG tablet, Take 100 mg by mouth 1 (One) Time As Needed for migraine., Disp: , Rfl:   •  traZODone (DESYREL) 150 MG tablet, TAKE 1 TABLET BY MOUTH EVERY NIGHT AS NEEDED SLEEP, Disp: , Rfl:   •  VENTOLIN  (90 Base) MCG/ACT inhaler, , Disp: , Rfl:   •  metOLazone (ZAROXOLYN) 2.5 MG tablet, , Disp: , Rfl:     Review of Systems   Constitutional:  "Positive for activity change, appetite change, fatigue and unexpected weight change.   HENT: Positive for dental problem, ear pain, facial swelling, sinus pressure and sore throat.    Eyes: Positive for redness and itching.   Respiratory: Positive for cough, chest tightness and shortness of breath.    Cardiovascular: Positive for chest pain and leg swelling.   Gastrointestinal: Positive for abdominal pain, constipation, diarrhea, nausea, rectal pain and vomiting.   Endocrine: Positive for cold intolerance and heat intolerance.   Genitourinary: Positive for menstrual problem and pelvic pain.   Musculoskeletal: Positive for back pain, gait problem, joint swelling, myalgias, neck pain and neck stiffness.   Allergic/Immunologic: Positive for environmental allergies.   Neurological: Positive for seizures, weakness, light-headedness, numbness and headaches.   Hematological: Bruises/bleeds easily.   Psychiatric/Behavioral: Positive for confusion, decreased concentration and sleep disturbance. The patient is nervous/anxious.        Vitals:    12/08/20 1046   BP: 142/84   BP Location: Right arm   Patient Position: Sitting   Cuff Size: Adult   Weight: 111 kg (245 lb 6.4 oz)   Height: 170.2 cm (67\")       Neurological Examination:    Mental status/speech: The patient is alert and oriented.  Speech is clear without aphysia or dysarthria.  No overt cognitive deficits.    Cranial nerve examination:    Olfaction: Smell is intact.  Vision: Vision is intact without visual field abnormalities.  Funduscopic examination is normal.  No pupillary irregularity.  Ocular motor examination: The extraocular muscles are intact.  There is no diplopia.  The pupil is round and reactive to both light and accommodation.  There is no nystagmus.  Facial movement/sensation: There is no facial weakness.  Sensation is intact in the first, second, and third divisions of the trigeminal nerve.  The corneal reflex is intact.  Auditory: Hearing is intact to " finger rub bilaterally.  Cranial nerves IX, X, XI, XII: Phonation is normal.  No dysphagia.  Tongue is protruded in the midline without atrophy.  The gag reflex is intact.  Shoulder shrug is normal.    Musculoligamentous ligamentous examination: BMI 38.4; weight 245 pounds.  Limited range of motion of cervical lumbar spine.  Straight leg raising, Lasègue flip and Rubin test negative.  I am unable to find weakness sensory loss or reflex asymmetry.  Gait normal    Medical Decision Making:     Diagnostic Data Set: Lumbar MRI shows significant degenerative disc disease with Modic changes and mild spinal stenosis at L5-S1.  There are no cervical MRI      Assessment: Advanced degenerative osteoarthritis of the spine          Recommendations: Have ordered a cervical MRI and she will call me after that.  She has significant disease throughout the spine which is degenerative in nature, progressive and for which surgery cannot provide a remedy.  I will continue to follow this and thank you for allow me to see her once again.        I greatly appreciate the opportunity to see and evaluate this individual.  If you have questions or concerns regarding issues that I may have overlooked please call me at any time: [phone].  Ha Hagen M.D.  [facility]  [address] [place] [address].  [place] [address]    Scribed for Isidoro Hagen MD by Naomi Caldwell CMA. 12/8/2020 11:48 EST

## 2020-12-10 ENCOUNTER — TELEPHONE (OUTPATIENT)
Dept: NEUROSURGERY | Facility: CLINIC | Age: 45
End: 2020-12-10

## 2020-12-10 RX ORDER — METHOCARBAMOL 750 MG/1
750 TABLET, FILM COATED ORAL 3 TIMES DAILY
Qty: 60 TABLET | Refills: 1 | Status: SHIPPED | OUTPATIENT
Start: 2020-12-10 | End: 2021-02-23 | Stop reason: SDUPTHER

## 2020-12-10 NOTE — TELEPHONE ENCOUNTER
PT SAW DR FAITH ON 12/8 AND SAYS HE WAS SUPPOSE TO CALL HER IN SOME ROBAXIN. PT SAYS PHARMACY DOES NOT HAVE IT.      [facility]      [phone]  [phone]

## 2020-12-28 ENCOUNTER — HOSPITAL ENCOUNTER (OUTPATIENT)
Dept: MRI IMAGING | Facility: HOSPITAL | Age: 45
Discharge: HOME | End: 2020-12-28
Admitting: NEUROLOGICAL SURGERY

## 2020-12-28 DIAGNOSIS — M54.2 NECK PAIN: ICD-10-CM

## 2020-12-28 DIAGNOSIS — M50.30 DDD (DEGENERATIVE DISC DISEASE), CERVICAL: ICD-10-CM

## 2020-12-28 PROCEDURE — 72141 MRI NECK SPINE W/O DYE: CPT

## 2021-01-06 ENCOUNTER — TELEPHONE (OUTPATIENT)
Dept: NEUROSURGERY | Facility: CLINIC | Age: 46
End: 2021-01-06

## 2021-01-06 NOTE — TELEPHONE ENCOUNTER
Caller: PT    Relationship to patient: SELF    Best call back number: [phone]        Additional notes:PT WANTS TO DISCUSS THE MRI CERVICAL WITH DR FAITH AND SEE IF HE CAN GET HER BACK INTO PAIN MANAGEMENT.      PLEASE ADVISE  THANK YOU

## 2021-01-19 ENCOUNTER — TELEPHONE (OUTPATIENT)
Dept: GASTROENTEROLOGY | Facility: CLINIC | Age: 46
End: 2021-01-19

## 2021-01-31 ENCOUNTER — APPOINTMENT (OUTPATIENT)
Dept: PREADMISSION TESTING | Facility: HOSPITAL | Age: 46
End: 2021-01-31

## 2021-02-03 ENCOUNTER — TRANSCRIBE ORDERS (OUTPATIENT)
Dept: LAB | Facility: HOSPITAL | Age: 46
End: 2021-02-03

## 2021-02-03 DIAGNOSIS — Z01.818 PRE-OP TESTING: Primary | ICD-10-CM

## 2021-02-17 ENCOUNTER — LAB (OUTPATIENT)
Dept: LAB | Facility: HOSPITAL | Age: 46
End: 2021-02-17

## 2021-02-17 DIAGNOSIS — Z01.818 PRE-OP TESTING: ICD-10-CM

## 2021-02-17 PROCEDURE — U0004 COV-19 TEST NON-CDC HGH THRU: HCPCS

## 2021-02-17 PROCEDURE — C9803 HOPD COVID-19 SPEC COLLECT: HCPCS

## 2021-02-18 LAB — SARS-COV-2 RNA RESP QL NAA+PROBE: NOT DETECTED

## 2021-02-19 ENCOUNTER — OUTSIDE FACILITY SERVICE (OUTPATIENT)
Dept: GASTROENTEROLOGY | Facility: CLINIC | Age: 46
End: 2021-02-19

## 2021-02-19 PROCEDURE — 88305 TISSUE EXAM BY PATHOLOGIST: CPT | Performed by: INTERNAL MEDICINE

## 2021-02-19 PROCEDURE — 43239 EGD BIOPSY SINGLE/MULTIPLE: CPT | Performed by: INTERNAL MEDICINE

## 2021-02-19 PROCEDURE — 43245 EGD DILATE STRICTURE: CPT | Performed by: INTERNAL MEDICINE

## 2021-02-19 RX ORDER — LANSOPRAZOLE 30 MG/1
30 CAPSULE, DELAYED RELEASE ORAL 2 TIMES DAILY
Qty: 60 CAPSULE | Refills: 11 | Status: SHIPPED | OUTPATIENT
Start: 2021-02-19 | End: 2021-04-15

## 2021-02-22 ENCOUNTER — LAB REQUISITION (OUTPATIENT)
Dept: LAB | Facility: HOSPITAL | Age: 46
End: 2021-02-22

## 2021-02-22 DIAGNOSIS — K22.70 BARRETT'S ESOPHAGUS WITHOUT DYSPLASIA: ICD-10-CM

## 2021-02-23 DIAGNOSIS — M51.36 DEGENERATIVE DISC DISEASE, LUMBAR: Primary | ICD-10-CM

## 2021-02-23 DIAGNOSIS — M54.2 NECK PAIN: ICD-10-CM

## 2021-02-23 DIAGNOSIS — M50.30 DDD (DEGENERATIVE DISC DISEASE), CERVICAL: ICD-10-CM

## 2021-02-23 LAB
CYTO UR: NORMAL
LAB AP CASE REPORT: NORMAL
LAB AP CLINICAL INFORMATION: NORMAL
LAB AP DIAGNOSIS COMMENT: NORMAL
PATH REPORT.FINAL DX SPEC: NORMAL
PATH REPORT.GROSS SPEC: NORMAL

## 2021-02-23 RX ORDER — METHOCARBAMOL 750 MG/1
750 TABLET, FILM COATED ORAL 3 TIMES DAILY
Qty: 60 TABLET | Refills: 1 | Status: SHIPPED | OUTPATIENT
Start: 2021-02-23

## 2021-02-23 NOTE — TELEPHONE ENCOUNTER
Provider:  Hieu  Caller: patient  Time of call:   3:39  Phone #:  [phone]  Surgery:  no  Surgery Date:    Last visit:   12/08/20  Next visit: None    ANTONY:         Reason for call:    Patient requests refill on Methocarbamol.

## 2021-04-15 ENCOUNTER — OFFICE VISIT (OUTPATIENT)
Dept: GASTROENTEROLOGY | Facility: CLINIC | Age: 46
End: 2021-04-15

## 2021-04-15 VITALS
HEART RATE: 80 BPM | HEIGHT: 67 IN | SYSTOLIC BLOOD PRESSURE: 124 MMHG | BODY MASS INDEX: 35.94 KG/M2 | WEIGHT: 229 LBS | DIASTOLIC BLOOD PRESSURE: 90 MMHG | TEMPERATURE: 97.8 F

## 2021-04-15 DIAGNOSIS — K59.03 DRUG-INDUCED CONSTIPATION: Primary | ICD-10-CM

## 2021-04-15 PROCEDURE — 99214 OFFICE O/P EST MOD 30 MIN: CPT | Performed by: NURSE PRACTITIONER

## 2021-04-15 RX ORDER — LUBIPROSTONE 24 UG/1
24 CAPSULE ORAL 2 TIMES DAILY WITH MEALS
Qty: 60 CAPSULE | Refills: 11 | Status: SHIPPED | OUTPATIENT
Start: 2021-04-15 | End: 2021-09-07

## 2021-04-15 RX ORDER — OMEPRAZOLE 40 MG/1
40 CAPSULE, DELAYED RELEASE ORAL 2 TIMES DAILY
COMMUNITY
Start: 2021-04-02 | End: 2021-04-15

## 2021-04-15 RX ORDER — FUROSEMIDE 20 MG/1
1 TABLET ORAL DAILY
COMMUNITY

## 2021-04-15 RX ORDER — TIOTROPIUM BROMIDE AND OLODATEROL 3.124; 2.736 UG/1; UG/1
1 SPRAY, METERED RESPIRATORY (INHALATION)
COMMUNITY

## 2021-04-15 RX ORDER — HYDROXYZINE PAMOATE 50 MG/1
1 CAPSULE ORAL DAILY
COMMUNITY
Start: 2021-03-29

## 2021-04-15 RX ORDER — LAMOTRIGINE 100 MG/1
100 TABLET ORAL 2 TIMES DAILY
COMMUNITY
Start: 2021-03-26

## 2021-04-15 RX ORDER — ESTRADIOL 0.05 MG/D
1 PATCH TRANSDERMAL
COMMUNITY
Start: 2021-03-29

## 2021-04-15 RX ORDER — RABEPRAZOLE SODIUM 20 MG/1
20 TABLET, DELAYED RELEASE ORAL 2 TIMES DAILY
Qty: 60 TABLET | Refills: 11 | Status: SHIPPED | OUTPATIENT
Start: 2021-04-15 | End: 2021-07-16 | Stop reason: SDUPTHER

## 2021-04-15 RX ORDER — UBROGEPANT 100 MG/1
1 TABLET ORAL AS NEEDED
COMMUNITY
Start: 2021-03-27

## 2021-04-15 RX ORDER — ONABOTULINUMTOXINA 200 [USP'U]/1
1 INJECTION, POWDER, LYOPHILIZED, FOR SOLUTION INTRADERMAL; INTRAMUSCULAR
COMMUNITY
Start: 2021-04-05

## 2021-04-15 NOTE — PROGRESS NOTES
GASTROENTEROLOGY OFFICE NOTE  Rdaha Rahman  [id number]  1975    CARE TEAM  Patient Care Team:  Demario Guajardo MD as PCP - General (Internal Medicine)  Elan Krause APRN as Referring Physician (Family Medicine)  Demario Guajardo MD as Referring Physician (Internal Medicine)    Referring Provider: Demario Guajardo MD    Chief Complaint   Patient presents with   • Constipation        HISTORY OF PRESENT ILLNESS:  Patient seen today with complaints of constipation.  She reports that she typically has a bowel movement every 3 to 4 days but for the past year or more, she sometimes goes up to 2 weeks without a bowel movement.  She noted the change in her bowel pattern after beginning Suboxone.  She has been taking MiraLAX daily without relief.  She has previously tried Linzess, again without relief in her symptoms.    Additionally, she has a history of dysphagia with duodenal strictures.  She is currently taking lansoprazole 30 mg twice daily without complete relief of acid reflux.  She reports she has heartburn 2-3 times weekly.  In the past, she has had therapeutic failure to omeprazole, Nexium, and pantoprazole.    PAST MEDICAL HISTORY  Past Medical History:   Diagnosis Date   • Arthritis    • Wade esophagus    • Wade syndrome    • Bladder infection    • Bronchitis    • Emphysema lung (CMS/HCC)    • Fibromyalgia    • GERD (gastroesophageal reflux disease)    • Hernia    • Hiatal hernia    • Hypertension    • Infectious viral hepatitis    • Migraines    • Ulcer         PAST SURGICAL HISTORY  Past Surgical History:   Procedure Laterality Date   •  SECTION     • ERCP     • GALLBLADDER SURGERY          MEDICATIONS:    Current Outpatient Medications:   •  tiotropium bromide-olodaterol (Stiolto Respimat) 2.5-2.5 MCG/ACT aerosol solution inhaler, Inhale 1 puff Daily., Disp: , Rfl:   •  ARIPiprazole (ABILIFY) 7.5 MG half tablet, Take 7.5 mg by mouth Daily., Disp: , Rfl:   •  Botox 200 units  reconstituted solution, Inject 1 Units into the appropriate muscle as directed by prescriber Every 3 (Three) Months., Disp: , Rfl:   •  buprenorphine-naloxone (SUBOXONE) 8-2 MG per SL tablet, Place 1 tablet under the tongue Daily., Disp: , Rfl:   •  busPIRone (BUSPAR) 15 MG tablet, Take 15 mg by mouth 2 (Two) Times a Day., Disp: , Rfl:   •  estradiol (CLIMARA) 0.05 MG/24HR patch, Place 1 patch on the skin as directed by provider Every 7 (Seven) Days., Disp: , Rfl:   •  furosemide (LASIX) 20 MG tablet, Take 1 tablet by mouth Daily., Disp: , Rfl:   •  gabapentin (NEURONTIN) 800 MG tablet, Take 800 mg by mouth 4 (Four) Times a Day., Disp: , Rfl:   •  hydrOXYzine (ATARAX) 50 MG tablet, , Disp: , Rfl:   •  hydrOXYzine pamoate (VISTARIL) 50 MG capsule, Take 1 capsule by mouth Daily., Disp: , Rfl:   •  imiquimod (ALDARA) 5 % cream, , Disp: , Rfl:   •  lamoTRIgine (LaMICtal) 100 MG tablet, Take 100 mg by mouth 2 (Two) Times a Day., Disp: , Rfl:   •  losartan (COZAAR) 100 MG tablet, Take 100 mg by mouth Daily., Disp: , Rfl:   •  lubiprostone (Amitiza) 24 MCG capsule, Take 1 capsule by mouth 2 (Two) Times a Day With Meals., Disp: 60 capsule, Rfl: 11  •  methocarbamol (ROBAXIN) 750 MG tablet, Take 1 tablet by mouth 3 (Three) Times a Day., Disp: 60 tablet, Rfl: 1  •  metoclopramide (REGLAN) 10 MG tablet, Take 10 mg by mouth 4 (Four) Times a Day Before Meals & at Bedtime., Disp: , Rfl:   •  mirtazapine (REMERON SOL-TAB) 45 MG disintegrating tablet, Place 45 mg on the tongue Every Night., Disp: , Rfl:   •  NASACORT ALLERGY 24HR 55 MCG/ACT nasal inhaler, , Disp: , Rfl:   •  ondansetron ODT (ZOFRAN-ODT) 8 MG disintegrating tablet, , Disp: , Rfl:   •  polyethylene glycol (MIRALAX) packet, Take 17 g by mouth Daily., Disp: , Rfl:   •  promethazine (PHENERGAN) 25 MG tablet, Take 25 mg by mouth Every 6 (Six) Hours As Needed for nausea or vomiting., Disp: , Rfl:   •  RABEprazole (ACIPHEX) 20 MG EC tablet, Take 1 tablet by mouth 2 (Two)  "Times a Day., Disp: 60 tablet, Rfl: 11  •  SUMAtriptan (IMITREX) 100 MG tablet, Take 100 mg by mouth 1 (One) Time As Needed for migraine., Disp: , Rfl:   •  traZODone (DESYREL) 150 MG tablet, TAKE 1 TABLET BY MOUTH EVERY NIGHT AS NEEDED SLEEP, Disp: , Rfl:   •  ubrogepant 100 MG tablet, Take 1 tablet by mouth As Needed., Disp: , Rfl:   •  VENTOLIN  (90 Base) MCG/ACT inhaler, , Disp: , Rfl:     ALLERGIES  Allergies   Allergen Reactions   • Bactrim [Sulfamethoxazole-Trimethoprim]    • Bupropion Other (See Comments)     seizures   • Stadol [Butorphanol]        FAMILY HISTORY:  Family History   Problem Relation Age of Onset   • Cancer Mother    • Arthritis Mother    • Heart disease Father    • Cancer Father    • Diabetes Father    • Diabetes Paternal Grandmother        SOCIAL HISTORY  Social History     Socioeconomic History   • Marital status:      Spouse name: Not on file   • Number of children: Not on file   • Years of education: Not on file   • Highest education level: Not on file   Tobacco Use   • Smoking status: Never Smoker   • Smokeless tobacco: Never Used   Vaping Use   • Vaping Use: Never used   Substance and Sexual Activity   • Alcohol use: No   • Drug use: Yes     Types: Heroin     Comment: 01/2011   • Sexual activity: Defer       REVIEW OF SYSTEMS  Review of Systems   Constitutional: Negative for activity change, appetite change, chills, diaphoresis, fatigue, fever, unexpected weight gain and unexpected weight loss.   HENT: Negative for trouble swallowing and voice change.    Gastrointestinal: Positive for constipation and GERD. Negative for abdominal distention, abdominal pain, anal bleeding, blood in stool, diarrhea, nausea, rectal pain, vomiting and indigestion.         PHYSICAL EXAM   /90 (BP Location: Right arm, Patient Position: Sitting, Cuff Size: Adult)   Pulse 80   Temp 97.8 °F (36.6 °C) (Temporal)   Ht 170.2 cm (67.01\")   Wt 104 kg (229 lb)   BMI 35.86 kg/m²   Physical " Exam  Vitals and nursing note reviewed.   Constitutional:       Appearance: Normal appearance. She is well-developed.   HENT:      Head: Normocephalic and atraumatic.      Nose: Nose normal.      Mouth/Throat:      Mouth: Mucous membranes are moist.      Pharynx: Oropharynx is clear.   Eyes:      Pupils: Pupils are equal, round, and reactive to light.   Cardiovascular:      Rate and Rhythm: Normal rate and regular rhythm.   Pulmonary:      Effort: Pulmonary effort is normal.      Breath sounds: Normal breath sounds. No wheezing or rales.   Abdominal:      General: Bowel sounds are normal.      Palpations: Abdomen is soft. There is no mass.      Tenderness: There is no abdominal tenderness. There is no guarding or rebound.      Hernia: No hernia is present.   Musculoskeletal:         General: Normal range of motion.      Cervical back: Normal range of motion and neck supple.   Skin:     General: Skin is warm and dry.   Neurological:      Mental Status: She is alert and oriented to person, place, and time.      Cranial Nerves: No cranial nerve deficit.   Psychiatric:         Behavior: Behavior normal.         Judgment: Judgment normal.       Results Review:  [facility] records reviewed and discussed with patient.     ASSESSMENT / PLAN  1.  Constipation-likely drug-induced  -Relistor 12 mg subq as needed (sample doses given to patient #2)  -Amitiza 24 mcg twice daily  2.  GERD  -AcipHex 20 mg twice daily    Return in about 3 months (around 7/15/2021).    I discussed the patients findings and my recommendations with patient    GENIA Rosa

## 2021-06-11 ENCOUNTER — TELEPHONE (OUTPATIENT)
Dept: NEUROSURGERY | Facility: CLINIC | Age: 46
End: 2021-06-11

## 2021-06-11 NOTE — TELEPHONE ENCOUNTER
I called pt back and told to check with her PCP and she said that would be fine. I told her that if she had further problems she could let us know. She was very nice and understood. Thank you.

## 2021-07-13 RX ORDER — ESTRADIOL 0.05 MG/D
PATCH TRANSDERMAL
Qty: 4 PATCH | Refills: 4 | OUTPATIENT
Start: 2021-07-13

## 2021-07-13 NOTE — TELEPHONE ENCOUNTER
PT WILL NOT BE AVAILABLE UNTIL AFTER 4:00 ALL THIS WEEK.  THANK YOU   Patient made aware of 24/7 emergency services.

## 2021-07-16 RX ORDER — RABEPRAZOLE SODIUM 20 MG/1
20 TABLET, DELAYED RELEASE ORAL 2 TIMES DAILY
Qty: 60 TABLET | Refills: 6 | Status: SHIPPED | OUTPATIENT
Start: 2021-07-16 | End: 2021-08-06

## 2021-08-06 ENCOUNTER — TELEPHONE (OUTPATIENT)
Dept: GASTROENTEROLOGY | Facility: CLINIC | Age: 46
End: 2021-08-06

## 2021-08-06 RX ORDER — DEXLANSOPRAZOLE 60 MG/1
60 CAPSULE, DELAYED RELEASE ORAL DAILY
Qty: 90 CAPSULE | Refills: 3 | Status: SHIPPED | OUTPATIENT
Start: 2021-08-06 | End: 2021-09-07

## 2021-08-06 NOTE — TELEPHONE ENCOUNTER
I have sent in a prescription for Dexilant 60 mg once daily.  Our computer shows that this is covered by her insurance with a prior authorization so you may need to get a PA for this.  My recent office note lists her previous therapeutic failures to PPIs.

## 2021-08-06 NOTE — TELEPHONE ENCOUNTER
Patient called and LVM that the Aciphex is not working for her and is requesting a alternative therapy.

## 2021-08-12 ENCOUNTER — TRANSCRIBE ORDERS (OUTPATIENT)
Dept: ADMINISTRATIVE | Facility: HOSPITAL | Age: 46
End: 2021-08-12

## 2021-08-12 DIAGNOSIS — Z12.31 VISIT FOR SCREENING MAMMOGRAM: Primary | ICD-10-CM

## 2021-08-13 ENCOUNTER — APPOINTMENT (OUTPATIENT)
Dept: MAMMOGRAPHY | Facility: HOSPITAL | Age: 46
End: 2021-08-13

## 2021-08-23 ENCOUNTER — TELEPHONE (OUTPATIENT)
Dept: GASTROENTEROLOGY | Facility: CLINIC | Age: 46
End: 2021-08-23

## 2021-09-07 ENCOUNTER — OFFICE VISIT (OUTPATIENT)
Dept: GASTROENTEROLOGY | Facility: CLINIC | Age: 46
End: 2021-09-07

## 2021-09-07 VITALS
BODY MASS INDEX: 37.32 KG/M2 | HEIGHT: 67 IN | WEIGHT: 237.8 LBS | DIASTOLIC BLOOD PRESSURE: 99 MMHG | SYSTOLIC BLOOD PRESSURE: 156 MMHG

## 2021-09-07 DIAGNOSIS — K59.04 CHRONIC IDIOPATHIC CONSTIPATION: ICD-10-CM

## 2021-09-07 DIAGNOSIS — R13.19 ESOPHAGEAL DYSPHAGIA: ICD-10-CM

## 2021-09-07 DIAGNOSIS — K21.00 GASTROESOPHAGEAL REFLUX DISEASE WITH ESOPHAGITIS WITHOUT HEMORRHAGE: Primary | ICD-10-CM

## 2021-09-07 DIAGNOSIS — K59.03 DRUG-INDUCED CONSTIPATION: ICD-10-CM

## 2021-09-07 PROCEDURE — 99214 OFFICE O/P EST MOD 30 MIN: CPT | Performed by: NURSE PRACTITIONER

## 2021-09-07 RX ORDER — PLECANATIDE 3 MG/1
3 TABLET ORAL DAILY
Qty: 90 TABLET | Refills: 3 | Status: SHIPPED | OUTPATIENT
Start: 2021-09-07 | End: 2022-11-10 | Stop reason: SDUPTHER

## 2021-09-07 RX ORDER — NALDEMEDINE 0.2 MG/1
1 TABLET ORAL DAILY
Qty: 90 TABLET | Refills: 3 | Status: SHIPPED | OUTPATIENT
Start: 2021-09-07 | End: 2021-09-14 | Stop reason: CLARIF

## 2021-09-07 RX ORDER — LANSOPRAZOLE 30 MG/1
30 CAPSULE, DELAYED RELEASE ORAL DAILY
Qty: 180 CAPSULE | Refills: 3 | Status: SHIPPED | OUTPATIENT
Start: 2021-09-07 | End: 2021-09-13

## 2021-09-07 NOTE — PROGRESS NOTES
GASTROENTEROLOGY OFFICE NOTE  Radha Rahman  [id number]  1975    CARE TEAM  Patient Care Team:  Demario Guajardo MD as PCP - General (Internal Medicine)  Elan Krause APRN as Referring Physician (Family Medicine)  Demario Guajardo MD as Referring Physician (Internal Medicine)    Referring Provider: Demario Guajardo MD    Chief Complaint   Patient presents with   • Constipation        HISTORY OF PRESENT ILLNESS:  Patient presents with complaints of worsening constipation.  She has been taking Linzess 290 mcg daily for over 6 months now.  She reports that it seemed to work well at first but now she is having a bowel movement only every 2 to 3 weeks.  Previously, she has tried Amitiza 24 mcg twice daily and Relistor subcutaneously which both worked for a while but failed to control her symptoms ultimately.    She has chronic acid reflux and a history of pyloric stenosis in the past which has required balloon dilation.  She has been taking Dexilant 60 mg daily but reports that it has not been working very well and therefore she has been taking 2 Dexilant 60 mg capsules every morning.  This has not proven to provide her much more benefit.  She is having trouble swallowing once again.    She has a history of Wade's esophagus.  Her last EGD was in February 2021.  A patchy salmon-colored mucosa was noted in the distal 1 to 2 cm of the esophagus but biopsies showed no goblet cell intestinal metaplasia.  The pylorus was noted to be stenotic and was dilated with interval assessment to 15 mm.  A stricture was noted in the second portion of the duodenal bulb which was biopsied to disrupt then dilated with interval assessment to 12 mm.    PAST MEDICAL HISTORY  Past Medical History:   Diagnosis Date   • Arthritis    • Wade esophagus    • Wade syndrome    • Bladder infection    • Bronchitis    • Emphysema lung (CMS/HCC)    • Fibromyalgia    • GERD (gastroesophageal reflux disease)    • Hernia    • Hiatal  hernia    • Hypertension    • Infectious viral hepatitis    • Migraines    • Ulcer         PAST SURGICAL HISTORY  Past Surgical History:   Procedure Laterality Date   •  SECTION     • ERCP     • GALLBLADDER SURGERY          MEDICATIONS:    Current Outpatient Medications:   •  ARIPiprazole (ABILIFY) 7.5 MG half tablet, Take 7.5 mg by mouth Daily., Disp: , Rfl:   •  Botox 200 units reconstituted solution, Inject 1 Units into the appropriate muscle as directed by prescriber Every 3 (Three) Months., Disp: , Rfl:   •  buprenorphine-naloxone (SUBOXONE) 8-2 MG per SL tablet, Place 1 tablet under the tongue Daily., Disp: , Rfl:   •  busPIRone (BUSPAR) 15 MG tablet, Take 15 mg by mouth 2 (Two) Times a Day., Disp: , Rfl:   •  estradiol (CLIMARA) 0.05 MG/24HR patch, Place 1 patch on the skin as directed by provider Every 7 (Seven) Days., Disp: , Rfl:   •  furosemide (LASIX) 20 MG tablet, Take 1 tablet by mouth Daily., Disp: , Rfl:   •  gabapentin (NEURONTIN) 800 MG tablet, Take 800 mg by mouth 4 (Four) Times a Day., Disp: , Rfl:   •  hydrOXYzine (ATARAX) 50 MG tablet, , Disp: , Rfl:   •  hydrOXYzine pamoate (VISTARIL) 50 MG capsule, Take 1 capsule by mouth Daily., Disp: , Rfl:   •  imiquimod (ALDARA) 5 % cream, , Disp: , Rfl:   •  lamoTRIgine (LaMICtal) 100 MG tablet, Take 100 mg by mouth 2 (Two) Times a Day., Disp: , Rfl:   •  lansoprazole (PREVACID) 30 MG capsule, Take 1 capsule by mouth Daily., Disp: 180 capsule, Rfl: 3  •  losartan (COZAAR) 100 MG tablet, Take 100 mg by mouth Daily., Disp: , Rfl:   •  methocarbamol (ROBAXIN) 750 MG tablet, Take 1 tablet by mouth 3 (Three) Times a Day., Disp: 60 tablet, Rfl: 1  •  metoclopramide (REGLAN) 10 MG tablet, Take 10 mg by mouth 4 (Four) Times a Day Before Meals & at Bedtime., Disp: , Rfl:   •  mirtazapine (REMERON SOL-TAB) 45 MG disintegrating tablet, Place 45 mg on the tongue Every Night., Disp: , Rfl:   •  Naldemedine Tosylate (Symproic) 0.2 MG tablet, Take 1 tablet by  mouth Daily., Disp: 90 tablet, Rfl: 3  •  NASACORT ALLERGY 24HR 55 MCG/ACT nasal inhaler, , Disp: , Rfl:   •  ondansetron ODT (ZOFRAN-ODT) 8 MG disintegrating tablet, , Disp: , Rfl:   •  Plecanatide (Trulance) 3 MG tablet, Take 1 tablet by mouth Daily., Disp: 90 tablet, Rfl: 3  •  polyethylene glycol (MIRALAX) packet, Take 17 g by mouth Daily., Disp: , Rfl:   •  promethazine (PHENERGAN) 25 MG tablet, Take 25 mg by mouth Every 6 (Six) Hours As Needed for nausea or vomiting., Disp: , Rfl:   •  SUMAtriptan (IMITREX) 100 MG tablet, Take 100 mg by mouth 1 (One) Time As Needed for migraine., Disp: , Rfl:   •  tiotropium bromide-olodaterol (Stiolto Respimat) 2.5-2.5 MCG/ACT aerosol solution inhaler, Inhale 1 puff Daily., Disp: , Rfl:   •  traZODone (DESYREL) 150 MG tablet, TAKE 1 TABLET BY MOUTH EVERY NIGHT AS NEEDED SLEEP, Disp: , Rfl:   •  ubrogepant 100 MG tablet, Take 1 tablet by mouth As Needed., Disp: , Rfl:   •  VENTOLIN  (90 Base) MCG/ACT inhaler, , Disp: , Rfl:     ALLERGIES  Allergies   Allergen Reactions   • Bactrim [Sulfamethoxazole-Trimethoprim]    • Bupropion Other (See Comments)     seizures   • Stadol [Butorphanol]        FAMILY HISTORY:  Family History   Problem Relation Age of Onset   • Cancer Mother    • Arthritis Mother    • Heart disease Father    • Cancer Father    • Diabetes Father    • Diabetes Paternal Grandmother        SOCIAL HISTORY  Social History     Socioeconomic History   • Marital status:      Spouse name: Not on file   • Number of children: Not on file   • Years of education: Not on file   • Highest education level: Not on file   Tobacco Use   • Smoking status: Never Smoker   • Smokeless tobacco: Never Used   Vaping Use   • Vaping Use: Never used   Substance and Sexual Activity   • Alcohol use: No   • Drug use: Yes     Types: Heroin     Comment: 01/2011   • Sexual activity: Defer       REVIEW OF SYSTEMS  Review of Systems   Constitutional: Negative for activity change,  "appetite change, chills, diaphoresis, fatigue, fever, unexpected weight gain and unexpected weight loss.   HENT: Positive for trouble swallowing. Negative for voice change.    Gastrointestinal: Positive for abdominal distention, constipation and GERD. Negative for abdominal pain, anal bleeding, blood in stool, diarrhea, nausea, rectal pain, vomiting and indigestion.         PHYSICAL EXAM   /99   Ht 170.2 cm (67\")   Wt 108 kg (237 lb 12.8 oz)   BMI 37.24 kg/m²   Physical Exam  Constitutional:       General: She is not in acute distress.     Appearance: She is well-developed.   HENT:      Head: Normocephalic and atraumatic.      Nose: Nose normal.   Eyes:      Conjunctiva/sclera: Conjunctivae normal.      Pupils: Pupils are equal, round, and reactive to light.   Pulmonary:      Effort: Pulmonary effort is normal.   Abdominal:      General: There is no distension.      Palpations: Abdomen is soft.      Tenderness: There is abdominal tenderness in the epigastric area.   Neurological:      Mental Status: She is alert and oriented to person, place, and time.   Psychiatric:         Behavior: Behavior normal.         Judgment: Judgment normal.           Results Review:  Detailed in HPI    ASSESSMENT / PLAN  1.  GERD  2.  Dysphagia  -Discontinue Dexilant  -Began Prevacid 30 mg twice daily    3.  Constipation  Chronic idiopathic versus opioid induced  -Begin Trulance 3 mg daily  -Begin Symproic 0.2 mg daily  -High-fiber diet, 30 g daily    Return for Follow up after procedures.    I discussed the patients findings and my recommendations with patient    GENIA Rosa                    "

## 2021-09-13 ENCOUNTER — TELEPHONE (OUTPATIENT)
Dept: GASTROENTEROLOGY | Facility: CLINIC | Age: 46
End: 2021-09-13

## 2021-09-13 RX ORDER — PANTOPRAZOLE SODIUM 40 MG/1
40 TABLET, DELAYED RELEASE ORAL 2 TIMES DAILY
Qty: 60 TABLET | Refills: 5 | Status: SHIPPED | OUTPATIENT
Start: 2021-09-13 | End: 2022-10-12

## 2021-09-13 NOTE — TELEPHONE ENCOUNTER
Patient call and LVM that the prevacid is not working and is requesting a alternative medication. Patient noted she has previously tried Dexilant and Omeprazole .

## 2021-09-13 NOTE — TELEPHONE ENCOUNTER
Called and notified patient that GENIA See has sent in a new prescription for Pantoprazole and she should discontinue the Prevacid

## 2021-09-14 RX ORDER — NALOXEGOL OXALATE 25 MG/1
TABLET, FILM COATED ORAL
Qty: 30 TABLET | Refills: 11 | Status: SHIPPED | OUTPATIENT
Start: 2021-09-14 | End: 2022-11-10 | Stop reason: SDUPTHER

## 2021-09-14 NOTE — TELEPHONE ENCOUNTER
Symproic was denied by patients insurance.  Will try movantik and see if we can get that covered.  RX sent to pharmacy

## 2021-09-28 ENCOUNTER — HOSPITAL ENCOUNTER (OUTPATIENT)
Dept: MAMMOGRAPHY | Facility: HOSPITAL | Age: 46
Discharge: HOME | End: 2021-09-28
Admitting: OBSTETRICS & GYNECOLOGY

## 2021-09-28 ENCOUNTER — TRANSCRIBE ORDERS (OUTPATIENT)
Dept: LAB | Facility: HOSPITAL | Age: 46
End: 2021-09-28

## 2021-09-28 ENCOUNTER — LAB (OUTPATIENT)
Dept: LAB | Facility: HOSPITAL | Age: 46
End: 2021-09-28

## 2021-09-28 DIAGNOSIS — Z12.31 VISIT FOR SCREENING MAMMOGRAM: ICD-10-CM

## 2021-09-28 DIAGNOSIS — Z20.822 COVID-19 RULED OUT: ICD-10-CM

## 2021-09-28 DIAGNOSIS — Z20.822 COVID-19 RULED OUT: Primary | ICD-10-CM

## 2021-09-28 PROCEDURE — 77067 SCR MAMMO BI INCL CAD: CPT

## 2021-09-28 PROCEDURE — U0004 COV-19 TEST NON-CDC HGH THRU: HCPCS

## 2021-09-28 PROCEDURE — 77063 BREAST TOMOSYNTHESIS BI: CPT

## 2021-09-28 PROCEDURE — C9803 HOPD COVID-19 SPEC COLLECT: HCPCS

## 2021-09-29 LAB — SARS-COV-2 RNA NOSE QL NAA+PROBE: NOT DETECTED

## 2021-10-14 DIAGNOSIS — Z01.812 ENCOUNTER FOR PREPROCEDURE SCREENING LABORATORY TESTING FOR COVID-19: Primary | ICD-10-CM

## 2021-10-14 DIAGNOSIS — Z20.822 ENCOUNTER FOR PREPROCEDURE SCREENING LABORATORY TESTING FOR COVID-19: Primary | ICD-10-CM

## 2021-10-24 ENCOUNTER — APPOINTMENT (OUTPATIENT)
Dept: PREADMISSION TESTING | Facility: HOSPITAL | Age: 46
End: 2021-10-24

## 2021-10-24 DIAGNOSIS — Z01.812 ENCOUNTER FOR PREPROCEDURE SCREENING LABORATORY TESTING FOR COVID-19: ICD-10-CM

## 2021-10-24 DIAGNOSIS — Z20.822 ENCOUNTER FOR PREPROCEDURE SCREENING LABORATORY TESTING FOR COVID-19: ICD-10-CM

## 2021-10-24 LAB — SARS-COV-2 RNA PNL SPEC NAA+PROBE: NOT DETECTED

## 2021-10-24 PROCEDURE — U0004 COV-19 TEST NON-CDC HGH THRU: HCPCS

## 2021-10-24 PROCEDURE — C9803 HOPD COVID-19 SPEC COLLECT: HCPCS

## 2021-10-26 ENCOUNTER — OUTSIDE FACILITY SERVICE (OUTPATIENT)
Dept: GASTROENTEROLOGY | Facility: CLINIC | Age: 46
End: 2021-10-26

## 2021-10-26 PROCEDURE — 43239 EGD BIOPSY SINGLE/MULTIPLE: CPT | Performed by: INTERNAL MEDICINE

## 2021-10-26 PROCEDURE — 43249 ESOPH EGD DILATION <30 MM: CPT | Performed by: INTERNAL MEDICINE

## 2021-10-26 PROCEDURE — 88305 TISSUE EXAM BY PATHOLOGIST: CPT | Performed by: INTERNAL MEDICINE

## 2021-10-27 ENCOUNTER — LAB REQUISITION (OUTPATIENT)
Dept: LAB | Facility: HOSPITAL | Age: 46
End: 2021-10-27

## 2021-10-27 DIAGNOSIS — K22.89 OTHER SPECIFIED DISEASE OF ESOPHAGUS: ICD-10-CM

## 2021-10-27 DIAGNOSIS — K44.9 DIAPHRAGMATIC HERNIA WITHOUT OBSTRUCTION OR GANGRENE: ICD-10-CM

## 2021-10-27 DIAGNOSIS — K31.5 OBSTRUCTION OF DUODENUM: ICD-10-CM

## 2021-10-29 RX ORDER — OMEPRAZOLE 40 MG/1
40 CAPSULE, DELAYED RELEASE ORAL 2 TIMES DAILY
Qty: 60 CAPSULE | Refills: 11 | Status: SHIPPED | OUTPATIENT
Start: 2021-10-29 | End: 2022-10-11 | Stop reason: SDUPTHER

## 2021-10-29 NOTE — TELEPHONE ENCOUNTER
Rx Refill Note  Requested Prescriptions     Pending Prescriptions Disp Refills   • omeprazole (priLOSEC) 40 MG capsule 60 capsule 11     Sig: Take 1 capsule by mouth 2 (Two) Times a Day.      Last office visit with prescribing clinician: Visit date not found      Next office visit with prescribing clinician: Visit date not found            Bobby Downey MA  10/29/21, 15:05 EDT

## 2021-11-16 ENCOUNTER — OFFICE VISIT (OUTPATIENT)
Dept: GASTROENTEROLOGY | Facility: CLINIC | Age: 46
End: 2021-11-16

## 2021-11-16 VITALS
SYSTOLIC BLOOD PRESSURE: 142 MMHG | WEIGHT: 241.2 LBS | HEIGHT: 66 IN | BODY MASS INDEX: 38.76 KG/M2 | HEART RATE: 105 BPM | TEMPERATURE: 97.1 F | DIASTOLIC BLOOD PRESSURE: 95 MMHG

## 2021-11-16 DIAGNOSIS — K59.04 CHRONIC IDIOPATHIC CONSTIPATION: ICD-10-CM

## 2021-11-16 DIAGNOSIS — K59.03 DRUG-INDUCED CONSTIPATION: ICD-10-CM

## 2021-11-16 DIAGNOSIS — E66.9 OBESITY (BMI 35.0-39.9 WITHOUT COMORBIDITY): Primary | ICD-10-CM

## 2021-11-16 DIAGNOSIS — K21.00 GASTROESOPHAGEAL REFLUX DISEASE WITH ESOPHAGITIS WITHOUT HEMORRHAGE: ICD-10-CM

## 2021-11-16 PROCEDURE — 99214 OFFICE O/P EST MOD 30 MIN: CPT | Performed by: NURSE PRACTITIONER

## 2021-11-16 RX ORDER — AMITRIPTYLINE HYDROCHLORIDE 100 MG/1
100 TABLET, FILM COATED ORAL NIGHTLY PRN
COMMUNITY
Start: 2021-11-04

## 2021-11-16 RX ORDER — CLINDAMYCIN PHOSPHATE 10 MG/G
GEL TOPICAL
COMMUNITY
Start: 2021-11-15

## 2021-11-16 RX ORDER — LAMOTRIGINE 200 MG/1
200 TABLET ORAL DAILY
COMMUNITY
Start: 2021-11-15

## 2021-11-16 NOTE — PROGRESS NOTES
GASTROENTEROLOGY OFFICE NOTE  Radha Rahman  [id number]  1975    CARE TEAM  Patient Care Team:  Demario Guajardo MD as PCP - General (Internal Medicine)  Elan Krause APRN as Referring Physician (Family Medicine)  Demario Guajardo MD as Referring Physician (Internal Medicine)    Referring Provider: Demario Guajardo MD    Chief Complaint   Patient presents with   • Constipation   • Difficulty Swallowing   • Nausea   • Heartburn        HISTORY OF PRESENT ILLNESS:  Patient returns in follow up s/p EGD for complaints of dysphagia. Wade's esophagus, a small hiatal hernia were noted as well as an acquired benign appearing duodenal stenosis was found in the first portion of the duodenum and was traversed after dilation. She has noted improvement in swallowing. She continues to have trouble with GERD which has been refractory to PPI therapy. She is currently taking Omeprazole 40 mg BID which is working better for her than any other PPI.    Currently taking Trulance 3 mg daily and Movantik 2 mg mg daily for constipation, her bowel movements have improved with this treatment plan.     She would like to see someone about possible weight loss surgery and requests referral.    PAST MEDICAL HISTORY  Past Medical History:   Diagnosis Date   • Arthritis    • Wade esophagus    • Wade syndrome    • Bladder infection    • Bronchitis    • Emphysema lung (HCC)    • Fibromyalgia    • GERD (gastroesophageal reflux disease)    • Hernia    • Hiatal hernia    • Hypertension    • Infectious viral hepatitis    • Migraines    • Ulcer         PAST SURGICAL HISTORY  Past Surgical History:   Procedure Laterality Date   •  SECTION     • ERCP     • GALLBLADDER SURGERY     • OOPHORECTOMY          MEDICATIONS:    Current Outpatient Medications:   •  ARIPiprazole (ABILIFY) 7.5 MG half tablet, Take 7.5 mg by mouth Daily., Disp: , Rfl:   •  Botox 200 units reconstituted solution, Inject 1 Units into the appropriate muscle  as directed by prescriber Every 3 (Three) Months., Disp: , Rfl:   •  buprenorphine-naloxone (SUBOXONE) 8-2 MG per SL tablet, Place 1 tablet under the tongue Daily., Disp: , Rfl:   •  estradiol (CLIMARA) 0.05 MG/24HR patch, Place 1 patch on the skin as directed by provider Every 7 (Seven) Days., Disp: , Rfl:   •  gabapentin (NEURONTIN) 800 MG tablet, Take 800 mg by mouth 4 (Four) Times a Day., Disp: , Rfl:   •  hydrOXYzine (ATARAX) 50 MG tablet, , Disp: , Rfl:   •  hydrOXYzine pamoate (VISTARIL) 50 MG capsule, Take 1 capsule by mouth Daily., Disp: , Rfl:   •  imiquimod (ALDARA) 5 % cream, , Disp: , Rfl:   •  lamoTRIgine (LaMICtal) 100 MG tablet, Take 100 mg by mouth 2 (Two) Times a Day., Disp: , Rfl:   •  methocarbamol (ROBAXIN) 750 MG tablet, Take 1 tablet by mouth 3 (Three) Times a Day., Disp: 60 tablet, Rfl: 1  •  Naloxegol Oxalate (Movantik) 25 MG tablet, Take 1 tablet by mouth daily, Disp: 30 tablet, Rfl: 11  •  NASACORT ALLERGY 24HR 55 MCG/ACT nasal inhaler, , Disp: , Rfl:   •  omeprazole (priLOSEC) 40 MG capsule, Take 1 capsule by mouth 2 (Two) Times a Day., Disp: 60 capsule, Rfl: 11  •  ondansetron ODT (ZOFRAN-ODT) 8 MG disintegrating tablet, , Disp: , Rfl:   •  Plecanatide (Trulance) 3 MG tablet, Take 1 tablet by mouth Daily., Disp: 90 tablet, Rfl: 3  •  promethazine (PHENERGAN) 25 MG tablet, Take 25 mg by mouth Every 6 (Six) Hours As Needed for nausea or vomiting., Disp: , Rfl:   •  SUMAtriptan (IMITREX) 100 MG tablet, Take 100 mg by mouth 1 (One) Time As Needed for migraine., Disp: , Rfl:   •  tiotropium bromide-olodaterol (Stiolto Respimat) 2.5-2.5 MCG/ACT aerosol solution inhaler, Inhale 1 puff Daily., Disp: , Rfl:   •  ubrogepant 100 MG tablet, Take 1 tablet by mouth As Needed., Disp: , Rfl:   •  VENTOLIN  (90 Base) MCG/ACT inhaler, , Disp: , Rfl:   •  amitriptyline (ELAVIL) 100 MG tablet, Take 100 mg by mouth At Night As Needed., Disp: , Rfl:   •  busPIRone (BUSPAR) 15 MG tablet, Take 15 mg by  "mouth 2 (Two) Times a Day., Disp: , Rfl:   •  clindamycin (CLINDAGEL) 1 % gel, , Disp: , Rfl:   •  furosemide (LASIX) 20 MG tablet, Take 1 tablet by mouth Daily., Disp: , Rfl:   •  lamoTRIgine (LaMICtal) 200 MG tablet, Take 200 mg by mouth Daily., Disp: , Rfl:   •  losartan (COZAAR) 100 MG tablet, Take 100 mg by mouth Daily., Disp: , Rfl:   •  metoclopramide (REGLAN) 10 MG tablet, Take 10 mg by mouth 4 (Four) Times a Day Before Meals & at Bedtime., Disp: , Rfl:   •  mirtazapine (REMERON SOL-TAB) 45 MG disintegrating tablet, Place 45 mg on the tongue Every Night., Disp: , Rfl:   •  pantoprazole (PROTONIX) 40 MG EC tablet, Take 1 tablet by mouth 2 (Two) Times a Day., Disp: 60 tablet, Rfl: 5  •  polyethylene glycol (MIRALAX) packet, Take 17 g by mouth Daily., Disp: , Rfl:   •  traZODone (DESYREL) 150 MG tablet, TAKE 1 TABLET BY MOUTH EVERY NIGHT AS NEEDED SLEEP, Disp: , Rfl:     ALLERGIES  Allergies   Allergen Reactions   • Bactrim [Sulfamethoxazole-Trimethoprim]    • Bupropion Other (See Comments)     seizures   • Stadol [Butorphanol]        FAMILY HISTORY:  Family History   Problem Relation Age of Onset   • Cancer Mother    • Arthritis Mother    • Breast cancer Mother    • Heart disease Father    • Cancer Father    • Diabetes Father    • Diabetes Paternal Grandmother    • Breast cancer Maternal Aunt        SOCIAL HISTORY  Social History     Socioeconomic History   • Marital status:    Tobacco Use   • Smoking status: Never Smoker   • Smokeless tobacco: Never Used   Vaping Use   • Vaping Use: Never used   Substance and Sexual Activity   • Alcohol use: No   • Drug use: Yes     Types: Heroin     Comment: 01/2011   • Sexual activity: Defer           PHYSICAL EXAM   /95 (BP Location: Left arm, Patient Position: Sitting, Cuff Size: Adult)   Pulse 105   Temp 97.1 °F (36.2 °C) (Temporal)   Ht 167.6 cm (66\")   Wt 109 kg (241 lb 3.2 oz)   BMI 38.93 kg/m²   Physical Exam  Constitutional:       General: She is " not in acute distress.     Appearance: She is well-developed.   HENT:      Head: Normocephalic and atraumatic.      Nose: Nose normal.   Eyes:      Conjunctiva/sclera: Conjunctivae normal.      Pupils: Pupils are equal, round, and reactive to light.   Pulmonary:      Effort: Pulmonary effort is normal.   Abdominal:      General: There is no distension.      Palpations: Abdomen is soft.   Neurological:      Mental Status: She is alert and oriented to person, place, and time.   Psychiatric:         Behavior: Behavior normal.         Judgment: Judgment normal.       Results Review:  Clinical Information    Diaphragmatic hernia without obstruction or gangrene, obstruction of duodenum, other specified disease of esophagus   Final Diagnosis   ESOPHAGUS BIOPSY:  Squamous mucosa with basal layer hyperplasia, vascular ectasia and increased intraepithelial leukocytes without eosinophils.  Gastric cardia type mucosa showing mild-moderate chronic gastritis with focal activity and reactive changes.  Histologic changes are compatible with reflux esophagitis.  Focal intestinal metaplasia present (see comment).  Negative for dysplasia.   Electronically signed by Alvarez Christie MD on 10/28/2021 at 0942   Comment    There is focal intestinal metaplasia present in the gastric cardia.  In the appropriate clinical setting, this finding would be compatible with a diagnosis of Wade's esophagus, although the histologic finding alone is not sufficient to establish this diagnosis.     ASSESSMENT / PLAN  1. GERD  -Omeprazole 40 mg BID  2. CIC  -Trulance 3 mg daily  3. Drug induced constipation  -Movantik 25 mg daily  4. Obesity - BMI 38.93   -Referral to Bariatric Surgery    Return in about 6 months (around 5/16/2022).    I discussed the patients findings and my recommendations with patient    GENIA Rosa

## 2022-09-21 RX ORDER — ESTRADIOL 0.07 MG/D
FILM, EXTENDED RELEASE TRANSDERMAL
Qty: 24 PATCH | Refills: 3 | OUTPATIENT
Start: 2022-09-21

## 2022-10-11 NOTE — TELEPHONE ENCOUNTER
Rx Refill Note  Requested Prescriptions     Pending Prescriptions Disp Refills   • omeprazole (priLOSEC) 40 MG capsule 60 capsule 1     Sig: Take 1 capsule by mouth 2 (Two) Times a Day.      Last office visit with prescribing clinician: 11/16/2021      Next office visit with prescribing clinician: 11/10/2022            Chula Diamond LPN  10/11/22, 14:30 EDT

## 2022-10-12 RX ORDER — OMEPRAZOLE 40 MG/1
40 CAPSULE, DELAYED RELEASE ORAL 2 TIMES DAILY
Qty: 60 CAPSULE | Refills: 1 | Status: SHIPPED | OUTPATIENT
Start: 2022-10-12

## 2022-11-10 ENCOUNTER — OFFICE VISIT (OUTPATIENT)
Dept: GASTROENTEROLOGY | Facility: CLINIC | Age: 47
End: 2022-11-10

## 2022-11-10 VITALS
HEART RATE: 92 BPM | TEMPERATURE: 97.7 F | BODY MASS INDEX: 36.93 KG/M2 | SYSTOLIC BLOOD PRESSURE: 132 MMHG | WEIGHT: 228.8 LBS | DIASTOLIC BLOOD PRESSURE: 87 MMHG

## 2022-11-10 DIAGNOSIS — K59.04 CHRONIC IDIOPATHIC CONSTIPATION: ICD-10-CM

## 2022-11-10 DIAGNOSIS — K21.00 GASTROESOPHAGEAL REFLUX DISEASE WITH ESOPHAGITIS WITHOUT HEMORRHAGE: ICD-10-CM

## 2022-11-10 DIAGNOSIS — R13.19 ESOPHAGEAL DYSPHAGIA: Primary | ICD-10-CM

## 2022-11-10 PROCEDURE — 99214 OFFICE O/P EST MOD 30 MIN: CPT | Performed by: NURSE PRACTITIONER

## 2022-11-10 RX ORDER — NALOXEGOL OXALATE 25 MG/1
TABLET, FILM COATED ORAL
Qty: 90 TABLET | Refills: 3 | Status: SHIPPED | OUTPATIENT
Start: 2022-11-10

## 2022-11-10 RX ORDER — PLECANATIDE 3 MG/1
3 TABLET ORAL DAILY
Qty: 90 TABLET | Refills: 3 | Status: SHIPPED | OUTPATIENT
Start: 2022-11-10

## 2022-11-10 NOTE — PROGRESS NOTES
GASTROENTEROLOGY OFFICE NOTE  Radha Rahman  [id number]  1975    CARE TEAM  Patient Care Team:  Demario Guajardo MD as PCP - General (Internal Medicine)  Elan Krause APRN as Referring Physician (Family Medicine)  Demario Guajardo MD as Referring Physician (Internal Medicine)    Referring Provider: Demario Guajardo MD    Chief Complaint   Patient presents with   • Difficulty Swallowing   • Heartburn        HISTORY OF PRESENT ILLNESS:  Patient is a 47-year-old female seen in follow-up with chronic GERD and constipation.    She has been doing very well in regards to constipation taking Movantik and Trulance.  She comes in today with concerns of difficulty swallowing, she is having some problems only with solids.  She continues to do well taking omeprazole 40 mg twice daily although she does have some heartburn on a very rare occasion usually when she forgets to take the second dose of omeprazole.    PAST MEDICAL HISTORY  Past Medical History:   Diagnosis Date   • Arthritis    • Wade esophagus    • Wade syndrome    • Bladder infection    • Bronchitis    • Emphysema lung (HCC)    • Fibromyalgia    • GERD (gastroesophageal reflux disease)    • Hernia    • Hiatal hernia    • Hypertension    • Infectious viral hepatitis    • Migraines    • Ulcer         PAST SURGICAL HISTORY  Past Surgical History:   Procedure Laterality Date   •  SECTION     • ERCP     • GALLBLADDER SURGERY     • OOPHORECTOMY          MEDICATIONS:    Current Outpatient Medications:   •  amitriptyline (ELAVIL) 100 MG tablet, Take 100 mg by mouth At Night As Needed., Disp: , Rfl:   •  ARIPiprazole (ABILIFY) 7.5 MG half tablet, Take 2 half tablet by mouth Daily., Disp: , Rfl:   •  Botox 200 units reconstituted solution, Inject 1 Units into the appropriate muscle as directed by prescriber Every 3 (Three) Months., Disp: , Rfl:   •  buprenorphine-naloxone (SUBOXONE) 8-2 MG per SL tablet, Place 1 tablet under the tongue Daily., Disp:  , Rfl:   •  clindamycin (CLINDAGEL) 1 % gel, , Disp: , Rfl:   •  estradiol (CLIMARA) 0.05 MG/24HR patch, Place 1 patch on the skin as directed by provider Every 7 (Seven) Days., Disp: , Rfl:   •  furosemide (LASIX) 20 MG tablet, Take 1 tablet by mouth Daily., Disp: , Rfl:   •  gabapentin (NEURONTIN) 800 MG tablet, Take 800 mg by mouth 4 (Four) Times a Day., Disp: , Rfl:   •  hydrOXYzine (ATARAX) 50 MG tablet, , Disp: , Rfl:   •  imiquimod (ALDARA) 5 % cream, , Disp: , Rfl:   •  lamoTRIgine (LaMICtal) 200 MG tablet, Take 200 mg by mouth Daily., Disp: , Rfl:   •  losartan (COZAAR) 100 MG tablet, Take 100 mg by mouth Daily., Disp: , Rfl:   •  methocarbamol (ROBAXIN) 750 MG tablet, Take 1 tablet by mouth 3 (Three) Times a Day., Disp: 60 tablet, Rfl: 1  •  metoclopramide (REGLAN) 10 MG tablet, Take 10 mg by mouth 4 (Four) Times a Day Before Meals & at Bedtime., Disp: , Rfl:   •  Naloxegol Oxalate (Movantik) 25 MG tablet, Take 1 tablet by mouth daily, Disp: 90 tablet, Rfl: 3  •  NASACORT ALLERGY 24HR 55 MCG/ACT nasal inhaler, , Disp: , Rfl:   •  omeprazole (priLOSEC) 40 MG capsule, Take 1 capsule by mouth 2 (Two) Times a Day., Disp: 60 capsule, Rfl: 1  •  ondansetron ODT (ZOFRAN-ODT) 8 MG disintegrating tablet, , Disp: , Rfl:   •  Plecanatide (Trulance) 3 MG tablet, Take 1 tablet by mouth Daily., Disp: 90 tablet, Rfl: 3  •  promethazine (PHENERGAN) 25 MG tablet, Take 25 mg by mouth Every 6 (Six) Hours As Needed for nausea or vomiting., Disp: , Rfl:   •  SUMAtriptan (IMITREX) 100 MG tablet, Take 100 mg by mouth 1 (One) Time As Needed for migraine., Disp: , Rfl:   •  tiotropium bromide-olodaterol (Stiolto Respimat) 2.5-2.5 MCG/ACT aerosol solution inhaler, Inhale 1 puff Daily., Disp: , Rfl:   •  ubrogepant 100 MG tablet, Take 1 tablet by mouth As Needed., Disp: , Rfl:   •  VENTOLIN  (90 Base) MCG/ACT inhaler, , Disp: , Rfl:   •  busPIRone (BUSPAR) 15 MG tablet, Take 15 mg by mouth 2 (Two) Times a Day., Disp: , Rfl:    •  hydrOXYzine pamoate (VISTARIL) 50 MG capsule, Take 1 capsule by mouth Daily., Disp: , Rfl:   •  lamoTRIgine (LaMICtal) 100 MG tablet, Take 100 mg by mouth 2 (Two) Times a Day., Disp: , Rfl:   •  polyethylene glycol (MIRALAX) packet, Take 17 g by mouth Daily., Disp: , Rfl:     ALLERGIES  Allergies   Allergen Reactions   • Bactrim [Sulfamethoxazole-Trimethoprim]    • Bupropion Other (See Comments)     seizures   • Stadol [Butorphanol]        FAMILY HISTORY:  Family History   Problem Relation Age of Onset   • Cancer Mother    • Arthritis Mother    • Breast cancer Mother    • Heart disease Father    • Cancer Father    • Diabetes Father    • Diabetes Paternal Grandmother    • Breast cancer Maternal Aunt        SOCIAL HISTORY  Social History     Socioeconomic History   • Marital status:    Tobacco Use   • Smoking status: Never   • Smokeless tobacco: Never   Vaping Use   • Vaping Use: Never used   Substance and Sexual Activity   • Alcohol use: No   • Drug use: Yes     Types: Heroin     Comment: 01/2011   • Sexual activity: Defer         PHYSICAL EXAM   /87 (BP Location: Left arm, Patient Position: Sitting, Cuff Size: Adult)   Pulse 92   Temp 97.7 °F (36.5 °C) (Temporal)   Wt 104 kg (228 lb 12.8 oz)   BMI 36.93 kg/m²   Physical Exam  Constitutional:       Appearance: Normal appearance.   HENT:      Head: Normocephalic and atraumatic.   Pulmonary:      Effort: Pulmonary effort is normal.   Neurological:      Mental Status: She is alert and oriented to person, place, and time.   Psychiatric:         Mood and Affect: Mood normal.         Thought Content: Thought content normal.             ASSESSMENT / PLAN  1.  Dysphagia  - EGD  2.  GERD  - Omeprazole 40 mg twice daily  3.  Constipation  - Trulance 3 mg daily  - Movantik 25 mg daily    Return for Follow up after procedures.    I discussed the patients findings and my recommendations with patient    GENIA Rosa

## 2022-12-19 RX ORDER — SODIUM, POTASSIUM,MAG SULFATES 17.5-3.13G
SOLUTION, RECONSTITUTED, ORAL ORAL
Qty: 354 ML | Refills: 0 | Status: SHIPPED | OUTPATIENT
Start: 2022-12-19

## 2022-12-29 ENCOUNTER — LAB REQUISITION (OUTPATIENT)
Dept: LAB | Facility: HOSPITAL | Age: 47
End: 2022-12-29
Payer: MEDICARE

## 2022-12-29 ENCOUNTER — OUTSIDE FACILITY SERVICE (OUTPATIENT)
Dept: GASTROENTEROLOGY | Facility: CLINIC | Age: 47
End: 2022-12-29
Payer: MEDICAID

## 2022-12-29 DIAGNOSIS — Z12.11 ENCOUNTER FOR SCREENING FOR MALIGNANT NEOPLASM OF COLON: ICD-10-CM

## 2022-12-29 DIAGNOSIS — R13.10 DYSPHAGIA, UNSPECIFIED: ICD-10-CM

## 2022-12-29 DIAGNOSIS — K31.5 OBSTRUCTION OF DUODENUM: ICD-10-CM

## 2022-12-29 PROCEDURE — 88312 SPECIAL STAINS GROUP 1: CPT

## 2022-12-29 PROCEDURE — 88305 TISSUE EXAM BY PATHOLOGIST: CPT

## 2022-12-29 PROCEDURE — G0121 COLON CA SCRN NOT HI RSK IND: HCPCS | Performed by: INTERNAL MEDICINE

## 2022-12-29 PROCEDURE — 43249 ESOPH EGD DILATION <30 MM: CPT | Performed by: INTERNAL MEDICINE

## 2022-12-29 PROCEDURE — 43239 EGD BIOPSY SINGLE/MULTIPLE: CPT | Performed by: INTERNAL MEDICINE

## 2023-01-03 LAB — REF LAB TEST METHOD: NORMAL

## 2023-01-05 DIAGNOSIS — Z79.890 HORMONE REPLACEMENT THERAPY: ICD-10-CM

## 2023-01-05 RX ORDER — ESTRADIOL 0.1 MG/D
FILM, EXTENDED RELEASE TRANSDERMAL
Qty: 8 PATCH | Refills: 1 | OUTPATIENT
Start: 2023-01-05

## 2023-08-08 ENCOUNTER — PRIOR AUTHORIZATION (OUTPATIENT)
Dept: GASTROENTEROLOGY | Facility: CLINIC | Age: 48
End: 2023-08-08
Payer: COMMERCIAL

## 2023-10-18 ENCOUNTER — HOSPITAL ENCOUNTER (EMERGENCY)
Facility: HOSPITAL | Age: 48
Discharge: HOME | End: 2023-10-18
Attending: EMERGENCY MEDICINE
Payer: COMMERCIAL

## 2023-10-18 ENCOUNTER — APPOINTMENT (OUTPATIENT)
Dept: GENERAL RADIOLOGY | Facility: HOSPITAL | Age: 48
End: 2023-10-18
Payer: COMMERCIAL

## 2023-10-18 VITALS
BODY MASS INDEX: 37.73 KG/M2 | WEIGHT: 234.8 LBS | HEART RATE: 101 BPM | SYSTOLIC BLOOD PRESSURE: 146 MMHG | OXYGEN SATURATION: 94 % | TEMPERATURE: 98.3 F | DIASTOLIC BLOOD PRESSURE: 97 MMHG | RESPIRATION RATE: 18 BRPM | HEIGHT: 66 IN

## 2023-10-18 DIAGNOSIS — S83.91XA SPRAIN OF RIGHT KNEE, UNSPECIFIED LIGAMENT, INITIAL ENCOUNTER: Primary | ICD-10-CM

## 2023-10-18 LAB
BILIRUB UR QL STRIP: NEGATIVE
CLARITY UR: CLEAR
COLOR UR: YELLOW
GLUCOSE UR STRIP-MCNC: NEGATIVE MG/DL
HGB UR QL STRIP.AUTO: NEGATIVE
KETONES UR QL STRIP: NEGATIVE
LEUKOCYTE ESTERASE UR QL STRIP.AUTO: NEGATIVE
NITRITE UR QL STRIP: NEGATIVE
PH UR STRIP.AUTO: 5.5 [PH] (ref 5–8)
PROT UR QL STRIP: NEGATIVE
SP GR UR STRIP: 1.01 (ref 1–1.03)
UROBILINOGEN UR QL STRIP: NORMAL

## 2023-10-18 PROCEDURE — 81003 URINALYSIS AUTO W/O SCOPE: CPT | Performed by: PHYSICIAN ASSISTANT

## 2023-10-18 PROCEDURE — 73562 X-RAY EXAM OF KNEE 3: CPT

## 2023-10-18 PROCEDURE — 99283 EMERGENCY DEPT VISIT LOW MDM: CPT

## 2023-10-18 NOTE — ED PROVIDER NOTES
Subjective  History of Present Illness:    Chief Complaint: Right knee pain, concern for UTI  History of Present Illness: 48-year-old female presents with right knee pain, she also states she is concerned about UTI, she has had some frequent urinations and would like to be checked.  She states she hurts her knee but she is unsure how, its been hurting for a few weeks and is worse with movement and sitting down no known injury.  Onset: Gradual onset  Duration: Knee pain for several weeks  Exacerbating / Alleviating factors: Pain is worse with movement and sitting  Associated symptoms: Patient would like to be checked for urinary tract infection having frequent urination      Nurses Notes reviewed and agree, including vitals, allergies, social history and prior medical history.     REVIEW OF SYSTEMS: All systems reviewed and not pertinent unless noted.    Review of Systems   Genitourinary:  Positive for frequency.   Musculoskeletal:         Right knee pain   All other systems reviewed and are negative.      Past Medical History:   Diagnosis Date    Arthritis     Wade esophagus     Wade syndrome     Bladder infection     Bronchitis     Emphysema lung     Fibromyalgia     GERD (gastroesophageal reflux disease)     Hernia     Hiatal hernia     Hypertension     Infectious viral hepatitis     Migraines     Ulcer        Allergies:    Bactrim [sulfamethoxazole-trimethoprim], Bupropion, and Stadol [butorphanol]      Past Surgical History:   Procedure Laterality Date     SECTION      ERCP      GALLBLADDER SURGERY      OOPHORECTOMY           Social History     Socioeconomic History    Marital status: Single   Tobacco Use    Smoking status: Never    Smokeless tobacco: Never   Vaping Use    Vaping Use: Never used   Substance and Sexual Activity    Alcohol use: No    Drug use: Yes     Types: Heroin     Comment: 2011    Sexual activity: Defer         Family History   Problem Relation Age of Onset    Cancer Mother   "   Arthritis Mother     Breast cancer Mother     Heart disease Father     Cancer Father     Diabetes Father     Diabetes Paternal Grandmother     Breast cancer Maternal Aunt        Objective  Physical Exam:  /97 (BP Location: Left arm, Patient Position: Sitting)   Pulse 101   Temp 98.3 °F (36.8 °C) (Oral)   Resp 18   Ht 167.6 cm (66\")   Wt 107 kg (234 lb 12.8 oz)   SpO2 94%   BMI 37.90 kg/m²      Physical Exam  Vitals and nursing note reviewed.   Constitutional:       Appearance: She is well-developed.   HENT:      Head: Normocephalic and atraumatic.   Cardiovascular:      Rate and Rhythm: Normal rate and regular rhythm.   Pulmonary:      Effort: Pulmonary effort is normal.      Breath sounds: Normal breath sounds.   Abdominal:      General: Bowel sounds are normal.      Palpations: Abdomen is soft.   Musculoskeletal:         General: Tenderness and signs of injury present.      Cervical back: Normal range of motion and neck supple.   Skin:     General: Skin is warm and dry.   Neurological:      Mental Status: She is alert and oriented to person, place, and time.      Deep Tendon Reflexes: Reflexes are normal and symmetric.           Procedures    ED Course:         Lab Results (last 24 hours)       Procedure Component Value Units Date/Time    Urinalysis With Culture If Indicated - Urine, Clean Catch [[id number]]  (Normal) Collected: 10/18/23 1836    Specimen: Urine, Clean Catch Updated: 10/18/23 1844     Color, UA Yellow     Appearance, UA Clear     pH, UA 5.5     Specific Gravity, UA 1.010     Glucose, UA Negative     Ketones, UA Negative     Bilirubin, UA Negative     Blood, UA Negative     Protein, UA Negative     Leuk Esterase, UA Negative     Nitrite, UA Negative     Urobilinogen, UA 0.2 E.U./dL    Narrative:      In absence of clinical symptoms, the presence of pyuria, bacteria, and/or nitrites on the urinalysis result does not correlate with infection.  Urine microscopic not indicated.         "     No radiology results from the last 24 hrs       Medical Decision Making  Problems Addressed:  Sprain of right knee, unspecified ligament, initial encounter: complicated acute illness or injury    Amount and/or Complexity of Data Reviewed  Radiology: ordered and independent interpretation performed.          Final diagnoses:   Sprain of right knee, unspecified ligament, initial encounter          Rafael Joseph Jr., PA-C  10/18/23 7194

## 2024-05-07 ENCOUNTER — TRANSCRIBE ORDERS (OUTPATIENT)
Dept: ADMINISTRATIVE | Facility: HOSPITAL | Age: 49
End: 2024-05-07
Payer: COMMERCIAL

## 2024-05-07 DIAGNOSIS — R31.9 HEMATURIA, UNSPECIFIED TYPE: Primary | ICD-10-CM

## 2024-05-10 ENCOUNTER — DOCUMENTATION (OUTPATIENT)
Dept: GASTROENTEROLOGY | Facility: CLINIC | Age: 49
End: 2024-05-10

## 2024-05-10 ENCOUNTER — OFFICE VISIT (OUTPATIENT)
Dept: GASTROENTEROLOGY | Facility: CLINIC | Age: 49
End: 2024-05-10
Payer: COMMERCIAL

## 2024-05-10 VITALS
OXYGEN SATURATION: 98 % | BODY MASS INDEX: 34.87 KG/M2 | HEIGHT: 66 IN | SYSTOLIC BLOOD PRESSURE: 120 MMHG | WEIGHT: 217 LBS | DIASTOLIC BLOOD PRESSURE: 70 MMHG | HEART RATE: 88 BPM

## 2024-05-10 DIAGNOSIS — R14.2 BELCHING: ICD-10-CM

## 2024-05-10 DIAGNOSIS — R10.9 ABDOMINAL PAIN, UNSPECIFIED ABDOMINAL LOCATION: ICD-10-CM

## 2024-05-10 DIAGNOSIS — K22.2 ESOPHAGEAL STENOSIS: Primary | ICD-10-CM

## 2024-05-10 DIAGNOSIS — R11.2 NAUSEA AND VOMITING, UNSPECIFIED VOMITING TYPE: ICD-10-CM

## 2024-05-10 DIAGNOSIS — R13.10 DYSPHAGIA, UNSPECIFIED TYPE: ICD-10-CM

## 2024-05-10 DIAGNOSIS — Z98.890 HISTORY OF COLONOSCOPY: ICD-10-CM

## 2024-05-10 DIAGNOSIS — K59.03 THERAPEUTIC OPIOID INDUCED CONSTIPATION: ICD-10-CM

## 2024-05-10 DIAGNOSIS — T40.2X5A THERAPEUTIC OPIOID INDUCED CONSTIPATION: ICD-10-CM

## 2024-05-10 PROBLEM — R07.9 CHEST PAIN: Status: ACTIVE | Noted: 2022-11-29

## 2024-05-10 PROBLEM — I20.89 ANGINA OF EFFORT: Chronic | Status: ACTIVE | Noted: 2023-11-08

## 2024-05-10 PROBLEM — J41.0 SIMPLE CHRONIC BRONCHITIS: Chronic | Status: ACTIVE | Noted: 2022-11-03

## 2024-05-10 PROBLEM — J44.9 CHRONIC OBSTRUCTIVE PULMONARY DISEASE: Chronic | Status: ACTIVE | Noted: 2022-11-03

## 2024-05-10 PROBLEM — F32.A DEPRESSION: Status: ACTIVE | Noted: 2022-11-03

## 2024-05-10 PROBLEM — M79.10 MYALGIA: Status: ACTIVE | Noted: 2022-11-03

## 2024-05-10 PROBLEM — S92.334A CLOSED NONDISPLACED FRACTURE OF THIRD METATARSAL BONE OF RIGHT FOOT: Status: ACTIVE | Noted: 2023-06-19

## 2024-05-10 PROBLEM — J45.40 MODERATE PERSISTENT ASTHMA WITHOUT COMPLICATION: Status: ACTIVE | Noted: 2022-11-03

## 2024-05-10 PROBLEM — R60.0 EDEMA OF EXTREMITIES: Status: ACTIVE | Noted: 2022-11-03

## 2024-05-10 PROBLEM — R56.9 SEIZURES: Chronic | Status: ACTIVE | Noted: 2022-11-03

## 2024-05-10 PROBLEM — Z79.890 HORMONE REPLACEMENT THERAPY: Status: ACTIVE | Noted: 2022-11-03

## 2024-05-10 PROBLEM — R91.1 SOLITARY PULMONARY NODULE: Status: ACTIVE | Noted: 2022-11-29

## 2024-05-10 PROBLEM — M25.562 CHRONIC PAIN OF BOTH KNEES: Status: ACTIVE | Noted: 2022-11-03

## 2024-05-10 PROBLEM — E53.8 B12 DEFICIENCY: Status: ACTIVE | Noted: 2022-11-03

## 2024-05-10 PROBLEM — M25.561 CHRONIC PAIN OF BOTH KNEES: Status: ACTIVE | Noted: 2022-11-03

## 2024-05-10 PROBLEM — G56.03 BILATERAL CARPAL TUNNEL SYNDROME: Status: ACTIVE | Noted: 2022-11-03

## 2024-05-10 PROBLEM — J43.2 CENTRILOBULAR EMPHYSEMA: Chronic | Status: ACTIVE | Noted: 2022-11-29

## 2024-05-10 PROBLEM — D53.9 ANEMIA ASSOCIATED WITH NUTRITIONAL DEFICIENCY: Status: ACTIVE | Noted: 2022-11-03

## 2024-05-10 PROBLEM — G89.29 CHRONIC PAIN OF BOTH KNEES: Status: ACTIVE | Noted: 2022-11-03

## 2024-05-10 PROBLEM — R06.02 SOB (SHORTNESS OF BREATH): Status: ACTIVE | Noted: 2022-11-29

## 2024-05-10 PROBLEM — R01.2: Status: ACTIVE | Noted: 2022-11-29

## 2024-05-10 PROBLEM — U07.0 VAPING-RELATED DISORDER: Status: ACTIVE | Noted: 2023-11-08

## 2024-05-10 PROBLEM — Z87.81: Status: ACTIVE | Noted: 2022-11-03

## 2024-05-10 RX ORDER — SUMATRIPTAN 25 MG/1
TABLET, FILM COATED ORAL
COMMUNITY
End: 2024-05-10 | Stop reason: SDUPTHER

## 2024-05-10 RX ORDER — RIMEGEPANT SULFATE 75 MG/75MG
1 TABLET, ORALLY DISINTEGRATING ORAL
COMMUNITY
Start: 2024-04-23

## 2024-05-10 RX ORDER — LUBIPROSTONE 24 UG/1
24 CAPSULE ORAL 2 TIMES DAILY WITH MEALS
Qty: 180 CAPSULE | Refills: 3 | Status: SHIPPED | OUTPATIENT
Start: 2024-05-10

## 2024-05-10 RX ORDER — PHENAZOPYRIDINE HYDROCHLORIDE 100 MG/1
TABLET, FILM COATED ORAL
COMMUNITY
Start: 2024-05-02

## 2024-05-10 RX ORDER — CLINDAMYCIN HYDROCHLORIDE 300 MG/1
CAPSULE ORAL
COMMUNITY
End: 2024-05-10

## 2024-05-10 RX ORDER — CIPROFLOXACIN 500 MG/1
TABLET, FILM COATED ORAL
COMMUNITY
Start: 2024-05-02

## 2024-05-20 ENCOUNTER — TELEPHONE (OUTPATIENT)
Dept: UROLOGY | Facility: CLINIC | Age: 49
End: 2024-05-20
Payer: COMMERCIAL

## 2024-05-20 NOTE — TELEPHONE ENCOUNTER
"Relay     \"TRIED CONTACTING PATIENT REGARING REFERRAL TO SEE TON CARD. NO ANSWER. SENT PATIENT MYCHART MESSAGE ABOUT SCHEDULING..    HUB OKAY TO SCHEDULE IF PATIENT CALLS BACK- NEXT AVAILABLE WITH TON CARD FOR NEW PATIENT. (SEE REFERRAL FOR INFO)\"                 "

## 2024-05-22 DIAGNOSIS — T40.2X5A THERAPEUTIC OPIOID INDUCED CONSTIPATION: Primary | ICD-10-CM

## 2024-05-22 DIAGNOSIS — K59.03 THERAPEUTIC OPIOID INDUCED CONSTIPATION: Primary | ICD-10-CM

## 2024-05-22 RX ORDER — NALDEMEDINE 0.2 MG/1
0.2 TABLET ORAL DAILY
Qty: 30 TABLET | Refills: 11 | Status: SHIPPED | OUTPATIENT
Start: 2024-05-22

## 2024-05-22 NOTE — PROGRESS NOTES
Patient notified, Relistor denied. Has to try & fail on 2 formularies. Movantik, & Symproic. Patient notified Symproic sent to pharmacy to take with Amitiza as previously prescribed. Patient voiced understanding.

## 2024-05-22 NOTE — PROGRESS NOTES
I reviewed the following Information provided does not show that the policy requirements have been met. The requirement(s) not met include: ONE of the following: - The trial and failure of at least TWO formulary alternative medications such as: SYMPROIC, MOVANTIK; OR - information stating that ALL available formulary alternatives are contraindicated, likely to be less effective, or will cause an adverse reaction or other harm to the patient (detailed rationale must be provided). We do have provided information which indicates you have tried Movantik but you must try one more formulary alternative. Please note: Preferred formulary alternatives may require a prior authorization review. Therefore, coverage for Relistor is denied.     Symproic prescribed

## 2024-05-22 NOTE — Clinical Note
Sorry about that last message, it was incomplete. Please let patient know that insurance denied approval of Relistor due to needing to try formulary alternative of Symproic. Recommend she take Symproic 0.2 mg daily for treatment of constipation with lubiprostone (Amitiza) twice daily as previously prescribed. Thanks!

## 2024-05-27 ENCOUNTER — HOSPITAL ENCOUNTER (OUTPATIENT)
Facility: HOSPITAL | Age: 49
Discharge: HOME | End: 2024-05-27
Admitting: NURSE PRACTITIONER
Payer: COMMERCIAL

## 2024-05-27 DIAGNOSIS — R31.9 HEMATURIA, UNSPECIFIED TYPE: ICD-10-CM

## 2024-05-27 PROCEDURE — 76775 US EXAM ABDO BACK WALL LIM: CPT

## 2024-06-20 ENCOUNTER — OFFICE VISIT (OUTPATIENT)
Dept: UROLOGY | Facility: CLINIC | Age: 49
End: 2024-06-20
Payer: COMMERCIAL

## 2024-06-20 DIAGNOSIS — R10.9 BILATERAL FLANK PAIN: ICD-10-CM

## 2024-06-20 DIAGNOSIS — R31.0 GROSS HEMATURIA: Primary | ICD-10-CM

## 2024-06-20 DIAGNOSIS — Z87.440 HISTORY OF RECURRENT UTI (URINARY TRACT INFECTION): ICD-10-CM

## 2024-06-20 PROCEDURE — 99214 OFFICE O/P EST MOD 30 MIN: CPT | Performed by: NURSE PRACTITIONER

## 2024-06-20 PROCEDURE — 81003 URINALYSIS AUTO W/O SCOPE: CPT | Performed by: NURSE PRACTITIONER

## 2024-06-20 PROCEDURE — 51798 US URINE CAPACITY MEASURE: CPT | Performed by: NURSE PRACTITIONER

## 2024-06-20 RX ORDER — LIDOCAINE 50 MG/G
1 PATCH TOPICAL EVERY 24 HOURS
Qty: 3 EACH | Refills: 0 | Status: SHIPPED | OUTPATIENT
Start: 2024-06-20

## 2024-06-20 NOTE — PROGRESS NOTES
UTI Office Visit      Patient Name: Radha Rahman  : 1975   MRN: [id number]     Chief Complaint:  UTI   Chief Complaint   Patient presents with    Recurrent uti       Referring Provider: Sammy Lopez MD    History of Present Illness: Ms. Rahman is a 49 y.o. female with history of GERD, Gastroparesis, Anemia, Seizures, Emphysema, COPD, and Hysterectomy who presents today for history of recurrent UTIs. She reports history of 4 UTIs within the last year. She reports intermittent episodes of gross hematuria. She denies family history of bladder or kidney cancer. She is a former smoker 1/2 - 1 PPD x 30 years. She stopped smoking cigarettes 3 years ago. She currently vapes.     She denies UTI symptoms today however she does report bilateral back/flank pain.     No recent urine cultures available. Renal US 24; No hydronephrosis. Possibly small nonobstructing bilateral nephrolithiasis. Left simple renal cyst.     UA negative today.   Bladder scan PVR 19cc.      Review of System: Review of Systems   Genitourinary:  Negative for decreased urine volume, difficulty urinating, dysuria, enuresis, flank pain, frequency, hematuria and urgency.   Musculoskeletal:  Positive for back pain.   All other systems reviewed and are negative.     I have reviewed the ROS documented by my clinical staff, I have updated appropriately and I agree. GENIA Herzog    Past Medical History:  Past Medical History:   Diagnosis Date    Arthritis     Wade esophagus     Wade syndrome     Bladder infection     Bronchitis     Emphysema lung     Fibromyalgia     GERD (gastroesophageal reflux disease)     Hernia     Hiatal hernia     Hypertension     Infectious viral hepatitis     Migraines     Ulcer        Past Surgical History:  Past Surgical History:   Procedure Laterality Date     SECTION      COLONOSCOPY      ERCP      GALLBLADDER SURGERY      OOPHORECTOMY      UPPER GASTROINTESTINAL ENDOSCOPY          Medications:    Current Outpatient Medications:     amitriptyline (ELAVIL) 100 MG tablet, Take 1 tablet by mouth At Night As Needed., Disp: , Rfl:     ARIPiprazole (ABILIFY) 7.5 MG half tablet, Take 2 half tablet by mouth Daily., Disp: , Rfl:     Botox 200 units reconstituted solution, Inject 1 Units into the appropriate muscle as directed by prescriber Every 3 (Three) Months., Disp: , Rfl:     buprenorphine-naloxone (SUBOXONE) 8-2 MG per SL tablet, Place 1 tablet under the tongue Daily., Disp: , Rfl:     ciprofloxacin (CIPRO) 500 MG tablet, TAKE ONE TABLET BY MOUTH TWICE DAILY FOR SEVEN DAYS, Disp: , Rfl:     clindamycin (CLINDAGEL) 1 % gel, , Disp: , Rfl:     estradiol (CLIMARA) 0.05 MG/24HR patch, Place 1 patch on the skin as directed by provider Every 7 (Seven) Days., Disp: , Rfl:     furosemide (LASIX) 20 MG tablet, Take 1 tablet by mouth Daily., Disp: , Rfl:     gabapentin (NEURONTIN) 800 MG tablet, Take 1 tablet by mouth 4 (Four) Times a Day., Disp: , Rfl:     imiquimod (ALDARA) 5 % cream, , Disp: , Rfl:     ipratropium-albuterol (COMBIVENT RESPIMAT)  MCG/ACT inhaler, Inhale 1 puff 4 (Four) Times a Day As Needed for Wheezing., Disp: , Rfl:     lamoTRIgine (LaMICtal) 200 MG tablet, Take 1 tablet by mouth Daily., Disp: , Rfl:     losartan (COZAAR) 100 MG tablet, Take 1 tablet by mouth Daily., Disp: , Rfl:     lubiprostone (Amitiza) 24 MCG capsule, Take 1 capsule by mouth 2 (Two) Times a Day With Meals., Disp: 180 capsule, Rfl: 3    methocarbamol (ROBAXIN) 750 MG tablet, Take 1 tablet by mouth 3 (Three) Times a Day., Disp: 60 tablet, Rfl: 1    Methylnaltrexone Bromide (Relistor) 150 MG tablet, Take 3 tablets by mouth Daily. With water greater than 30 minutes prior to first meal, Disp: 90 tablet, Rfl: 11    metoclopramide (REGLAN) 10 MG tablet, Take 1 tablet by mouth 4 (Four) Times a Day Before Meals & at Bedtime., Disp: , Rfl:     mupirocin (BACTROBAN) 2 % ointment, , Disp: , Rfl:     Naldemedine  Tosylate (Symproic) 0.2 MG tablet, Take 0.2 mg by mouth Daily., Disp: 30 tablet, Rfl: 11    Naloxegol Oxalate (Movantik) 25 MG tablet, Take 1 tablet by mouth daily, Disp: 90 tablet, Rfl: 3    omeprazole (priLOSEC) 40 MG capsule, Take 1 capsule by mouth 2 (Two) Times a Day., Disp: 60 capsule, Rfl: 1    ondansetron ODT (ZOFRAN-ODT) 8 MG disintegrating tablet, , Disp: , Rfl:     phenazopyridine (PYRIDIUM) 100 MG tablet, TAKE TWO TABLETS BY MOUTH THREE TIMES DAILY FOR TWO DAYS, Disp: , Rfl:     Plecanatide (Trulance) 3 MG tablet, Take 1 tablet by mouth Daily., Disp: 90 tablet, Rfl: 3    promethazine (PHENERGAN) 25 MG tablet, Take 1 tablet by mouth Every 6 (Six) Hours As Needed for Nausea or Vomiting., Disp: , Rfl:     Rimegepant Sulfate (Nurtec) 75 MG tablet dispersible tablet, Take 1 tablet by mouth., Disp: , Rfl:     SUMAtriptan (IMITREX) 100 MG tablet, Take 1 tablet by mouth 1 (One) Time As Needed for Migraine., Disp: , Rfl:     ubrogepant 100 MG tablet, Take 1 tablet by mouth As Needed., Disp: , Rfl:     VENTOLIN  (90 Base) MCG/ACT inhaler, , Disp: , Rfl:     hydrOXYzine (ATARAX) 50 MG tablet, , Disp: , Rfl:     lidocaine (LIDODERM) 5 %, Place 1 patch on the skin as directed by provider Daily. Remove & Discard patch within 12 hours or as directed by MD, Disp: 3 each, Rfl: 0    Allergies:  Allergies   Allergen Reactions    Bactrim [Sulfamethoxazole-Trimethoprim]     Bupropion Other (See Comments)     seizures    Stadol [Butorphanol]        Social History:  Social History     Socioeconomic History    Marital status: Single   Tobacco Use    Smoking status: Every Day     Types: Electronic Cigarette     Passive exposure: Current    Smokeless tobacco: Never   Vaping Use    Vaping status: Every Day   Substance and Sexual Activity    Alcohol use: No    Drug use: Yes     Types: Heroin     Comment: 01/2011    Sexual activity: Defer       Family History:  Family History   Problem Relation Age of Onset    Cancer Mother      Arthritis Mother     Breast cancer Mother     Heart disease Father     Cancer Father     Diabetes Father     Breast cancer Maternal Aunt     Diabetes Paternal Grandmother     Colon cancer Neg Hx      Bladder & Bowel Symptom Questionnaire    How often do you usually urinate during the day ?   0 - No more often than once in 4 hours    2.   How many timed do you urinate at night?   0 - 0-1 time at night   3.   What is the reason that you usually urinate?   2 - Moderate urge (can delay 10-60 min)   4.   Once you get the urge to go, how long can you     comfortably delay?   2 - 10-30 min   5.   How often do you get a sudden urge that makes you rush to the bathroom?   1 - Rarely   6.   How often does a sudden urge to urinate result in you leaking urine or wetting pads?   0 - Never   7.  In your opinion, how good is your bladder control?   2 - Good   8.  Do you have accidental bowel leakage?   no   9.  Do you have difficulty fully emptying your bladder?   no   10.  Do you experience accidental leakage when performing some physical activity such as coughing, sneezing, laughing or exercise?   no   11. Have you tried medications to help improve your symptoms?   no   12. Would you be interested in learning about a long-lasting option that may help you with your symptoms?   no                                                                             Total Score   7     0-7 (Mild) 8-16 (Moderate) 17-28 (Severe)       Post void residual bladder scan:    19cc     Objective     Physical Exam:   Vital Signs: There were no vitals filed for this visit.  There is no height or weight on file to calculate BMI.     Physical Exam  Vitals and nursing note reviewed.   Constitutional:       Appearance: Normal appearance.   HENT:      Head: Normocephalic and atraumatic.      Nose: Nose normal.      Mouth/Throat:      Mouth: Mucous membranes are moist.   Eyes:      Pupils: Pupils are equal, round, and reactive to light.   Pulmonary:       "Effort: Pulmonary effort is normal.   Abdominal:      General: Abdomen is flat.      Palpations: Abdomen is soft.   Musculoskeletal:         General: Normal range of motion.      Cervical back: Normal range of motion.   Skin:     General: Skin is warm and dry.      Capillary Refill: Capillary refill takes less than 2 seconds.   Neurological:      General: No focal deficit present.      Mental Status: She is alert.   Psychiatric:         Mood and Affect: Mood normal.       Labs:   Brief Urine Lab Results  (Last result in the past 365 days)        Color   Clarity   Blood   Leuk Est   Nitrite   Protein   CREAT   Urine HCG        10/18/23 1836 Yellow   Clear   Negative   Negative   Negative   Negative                        No results found for: \"GLUCOSE\", \"CALCIUM\", \"NA\", \"K\", \"CO2\", \"CL\", \"BUN\", \"CREATININE\", \"EGFRIFAFRI\", \"EGFRIFNONA\", \"BCR\", \"ANIONGAP\"    No results found for: \"WBC\", \"HGB\", \"HCT\", \"MCV\", \"PLT\"    Images:   US Renal Bilateral    Result Date: 5/29/2024  Impression: Normal sized kidneys, with suspected small nonobstructing bilateral renal calculi. No evidence of obstructive uropathy. Electronically Signed: Kevin Sandhu MD  5/29/2024 4:49 PM EDT  Workstation ID: [id number]      Measures:   Tobacco:   Radha Rahman  reports that she has been smoking electronic cigarette. She has been exposed to tobacco smoke. She has never used smokeless tobacco. I have educated her on the risk of diseases from using tobacco products such as bladder and kidney cancer.     I advised her to quit.       Urine Incontinence: Patient reports that she is not currently experiencing any symptoms of urinary incontinence.      Assessment / Plan      Assessment:  Ms. Rahman is a 49 y.o. who presented today with history of recurrent UTIs. (No recent cultures available).     We discussed proceeding with CT Urogram and Cystoscopy for comprehensive gross hematuria workup given her smoking history >30 years. She will follow up in 2-3 " weeks for Cystoscopy with CT urogram prior. She requests Lidocaine patch for her back pain. If hematuria workup is negative we will further discuss UTI prevention strategies. She is already managed on Estradiol patch due to history of hysterectomy. She is understanding and agreeable with plan of care.     Diagnoses and all orders for this visit:    1. Gross hematuria (Primary)  -     CT Abdomen Pelvis With & Without Contrast; Future    2. Bilateral flank pain  -     lidocaine (LIDODERM) 5 %; Place 1 patch on the skin as directed by provider Daily. Remove & Discard patch within 12 hours or as directed by MD  Dispense: 3 each; Refill: 0    3. History of recurrent UTI (urinary tract infection)         Follow Up:   Return in 2 weeks (on 7/4/2024) for Cystoscopy Dr. Love, CT Urogram prior .    I spent approximately 30 minutes providing clinical care for this patient; including review of patient's chart and provider documentation, face to face time spent with patient in examination room (obtaining history, performing physical exam, discussing diagnosis and management options), placing orders, and completing patient documentation.     GENIA Herzog  [facility]

## 2024-06-25 ENCOUNTER — TELEPHONE (OUTPATIENT)
Dept: GASTROENTEROLOGY | Facility: CLINIC | Age: 49
End: 2024-06-25
Payer: COMMERCIAL

## 2024-06-25 NOTE — TELEPHONE ENCOUNTER
Hub called patient was checking to see if there was a later procedure time on Thursday 06/27/2024. Nothing at this time.

## 2024-06-27 ENCOUNTER — OUTSIDE FACILITY SERVICE (OUTPATIENT)
Dept: GASTROENTEROLOGY | Facility: CLINIC | Age: 49
End: 2024-06-27
Payer: COMMERCIAL

## 2024-06-27 PROCEDURE — 43239 EGD BIOPSY SINGLE/MULTIPLE: CPT | Performed by: INTERNAL MEDICINE

## 2024-06-27 PROCEDURE — 88305 TISSUE EXAM BY PATHOLOGIST: CPT | Performed by: INTERNAL MEDICINE

## 2024-06-27 PROCEDURE — 43249 ESOPH EGD DILATION <30 MM: CPT | Performed by: INTERNAL MEDICINE

## 2024-06-28 ENCOUNTER — LAB REQUISITION (OUTPATIENT)
Dept: LAB | Facility: HOSPITAL | Age: 49
End: 2024-06-28
Payer: COMMERCIAL

## 2024-06-28 DIAGNOSIS — R13.10 DYSPHAGIA, UNSPECIFIED: ICD-10-CM

## 2024-06-28 DIAGNOSIS — K44.9 DIAPHRAGMATIC HERNIA WITHOUT OBSTRUCTION OR GANGRENE: ICD-10-CM

## 2024-06-28 DIAGNOSIS — K31.5 OBSTRUCTION OF DUODENUM: ICD-10-CM

## 2024-06-28 DIAGNOSIS — K22.2 ESOPHAGEAL OBSTRUCTION: ICD-10-CM

## 2024-07-01 ENCOUNTER — TELEPHONE (OUTPATIENT)
Dept: UROLOGY | Facility: CLINIC | Age: 49
End: 2024-07-01
Payer: COMMERCIAL

## 2024-07-01 LAB — REF LAB TEST METHOD: NORMAL

## 2024-07-01 NOTE — TELEPHONE ENCOUNTER
Caller: BRITANY ENCARNACION    Relationship to patient: SELF    Best call back number: [phone]    Chief complaint: INCOMING CALL FROM PT. PT NEEDS TO RESCHEDULE CYSTOSCOPY.     Type of visit: CYSTOSCOPY    Requested date:AFTER JULY 31ST    If rescheduling, when is the original appointment:  8/13/24    Additional notes:

## 2024-08-02 ENCOUNTER — HOSPITAL ENCOUNTER (OUTPATIENT)
Dept: CT IMAGING | Facility: HOSPITAL | Age: 49
Discharge: HOME | End: 2024-08-02
Admitting: NURSE PRACTITIONER
Payer: COMMERCIAL

## 2024-08-02 DIAGNOSIS — R31.0 GROSS HEMATURIA: ICD-10-CM

## 2024-08-02 PROCEDURE — 25510000001 IOPAMIDOL 61 % SOLUTION: Performed by: NURSE PRACTITIONER

## 2024-08-02 PROCEDURE — 74178 CT ABD&PLV WO CNTR FLWD CNTR: CPT

## 2024-08-02 RX ADMIN — IOPAMIDOL 100 ML: 612 INJECTION, SOLUTION INTRAVENOUS at 15:51

## 2024-08-05 ENCOUNTER — PROCEDURE VISIT (OUTPATIENT)
Age: 49
End: 2024-08-05
Payer: COMMERCIAL

## 2024-08-05 VITALS — HEIGHT: 66 IN | WEIGHT: 217 LBS | BODY MASS INDEX: 34.87 KG/M2

## 2024-08-05 DIAGNOSIS — R31.0 GROSS HEMATURIA: Primary | ICD-10-CM

## 2024-08-05 DIAGNOSIS — Z87.440 HISTORY OF RECURRENT UTI (URINARY TRACT INFECTION): ICD-10-CM

## 2024-08-05 DIAGNOSIS — L73.2 HIDRADENITIS SUPPURATIVA: ICD-10-CM

## 2024-08-05 DIAGNOSIS — R10.9 BILATERAL FLANK PAIN: ICD-10-CM

## 2024-08-05 LAB
BILIRUB BLD-MCNC: NEGATIVE MG/DL
CLARITY, POC: ABNORMAL
COLOR UR: YELLOW
EXPIRATION DATE: ABNORMAL
GLUCOSE UR STRIP-MCNC: NEGATIVE MG/DL
KETONES UR QL: NEGATIVE
LEUKOCYTE EST, POC: NEGATIVE
Lab: ABNORMAL
NITRITE UR-MCNC: NEGATIVE MG/ML
PH UR: 6 [PH] (ref 5–8)
PROT UR STRIP-MCNC: ABNORMAL MG/DL
RBC # UR STRIP: NEGATIVE /UL
SP GR UR: 1.01 (ref 1–1.03)
UROBILINOGEN UR QL: NORMAL

## 2024-08-05 PROCEDURE — 99214 OFFICE O/P EST MOD 30 MIN: CPT | Performed by: STUDENT IN AN ORGANIZED HEALTH CARE EDUCATION/TRAINING PROGRAM

## 2024-08-05 PROCEDURE — 87186 SC STD MICRODIL/AGAR DIL: CPT | Performed by: STUDENT IN AN ORGANIZED HEALTH CARE EDUCATION/TRAINING PROGRAM

## 2024-08-05 PROCEDURE — 87086 URINE CULTURE/COLONY COUNT: CPT | Performed by: STUDENT IN AN ORGANIZED HEALTH CARE EDUCATION/TRAINING PROGRAM

## 2024-08-05 PROCEDURE — 81003 URINALYSIS AUTO W/O SCOPE: CPT | Performed by: STUDENT IN AN ORGANIZED HEALTH CARE EDUCATION/TRAINING PROGRAM

## 2024-08-05 PROCEDURE — 52000 CYSTOURETHROSCOPY: CPT | Performed by: STUDENT IN AN ORGANIZED HEALTH CARE EDUCATION/TRAINING PROGRAM

## 2024-08-05 PROCEDURE — 87077 CULTURE AEROBIC IDENTIFY: CPT | Performed by: STUDENT IN AN ORGANIZED HEALTH CARE EDUCATION/TRAINING PROGRAM

## 2024-08-05 RX ORDER — DOXYCYCLINE HYCLATE 100 MG/1
100 CAPSULE ORAL 2 TIMES DAILY
Qty: 14 CAPSULE | Refills: 0 | Status: SHIPPED | OUTPATIENT
Start: 2024-08-05 | End: 2024-08-12

## 2024-08-05 RX ORDER — ARIPIPRAZOLE 20 MG/1
1 TABLET ORAL DAILY
COMMUNITY
Start: 2024-07-03

## 2024-08-05 RX ORDER — AMITRIPTYLINE HYDROCHLORIDE 150 MG/1
150 TABLET ORAL
COMMUNITY
Start: 2024-07-24

## 2024-08-05 NOTE — PROGRESS NOTES
Preprocedure diagnosis  Recurrent UTI  Gross hematuria    Postprocedure diagnosis  Recurrent UTI  Gross hematuria    Procedure  Flexible Cystourethroscopy    Attending surgeon  Saurabh Love MD    Anesthesia  2% lidocaine jelly intraurethrally    Complications  None    Indications  49 y.o. female undergoing a flexible cystoscopy for the above mentioned indications.  CT urogram reviewed.  There is no evidence of bladder mass, filling defect or kidney stones.  Radiology mentions possible anterior bladder wall lesion, this is not personally identified    Informed consent was obtained prior to the procedure start.       Findings  Cystoscopy revealed normal bladder mucosa with NO tumors, masses, stones or trabeculations noted.      Procedure  The patient was placed in supine position and prepped and draped in sterile fashion with lidocaine jelly instilled 5 minutes pre-procedure start.  A brief timeout including available nursing staff and awake patient was performed.  The 16 Fr digital flexible cystoscope was lubricated and gently placed into the urethral meatus. The proximal and distal portions of the urethra appeared well vascularized and normal in appearance. The bladder neck was visualized and appeared well vascularized without mucosal lesions or abnormal appearance. The bladder was then entered and  completely visualized including the trigone. There were bilateral orthotopic ureteral orifices which appeared patent and effluxed clear yellow urine. The posterior wall, lateral walls, anterior wall, and dome were visualized. The cystoscope was then retroflexed and the bladder neck was further visualized and appeared normal.  The scope was gently withdrawn and the procedure terminated.  The patient tolerated the procedure well.       On examination the patient has a 1 cm erythematous, raised lesion with a small opening at the right groin crease/labia, she endorses a history of hidradenitis, this has some drainage,  clear fluid    Patient was moved to the clinic examination room for further discussion.    Follow Up:     Gross Hematuria  Cystoscopy entirely normal today.  No evidence of bladder mass no evidence of cystitis.  Urethra appears well-vascularized, no diverticulum or lesions  CT urogram entirely negative as well.    Recurrent UTI  Patient is concerned about recurrent UTI, we are very deficient regarding data regarding cultures.  I have ordered a standing urine culture, she can drop urines off at [facility] lab with concern for UTI so we can begin to trend her cultures    Hidradenitis suppurative right labia/groin  Patient reports hidradenitis suppurativa involving the right labia and groin, examination demonstrates a small erythematous lesion draining clear fluid.  I will place her on Doxycycline for this for 2 weeks. Risks discussed  Return precautions discussed    30 mins spent with patient in discussion today.    Saurabh Love MD

## 2024-08-06 DIAGNOSIS — B37.31 VAGINAL YEAST INFECTION: ICD-10-CM

## 2024-08-06 DIAGNOSIS — N30.00 ACUTE CYSTITIS WITHOUT HEMATURIA: Primary | ICD-10-CM

## 2024-08-06 RX ORDER — FLUCONAZOLE 150 MG/1
150 TABLET ORAL ONCE
Qty: 1 TABLET | Refills: 0 | Status: SHIPPED | OUTPATIENT
Start: 2024-08-06 | End: 2024-08-06

## 2024-08-06 RX ORDER — CEFUROXIME AXETIL 500 MG/1
500 TABLET ORAL 2 TIMES DAILY
Qty: 10 TABLET | Refills: 0 | Status: SHIPPED | OUTPATIENT
Start: 2024-08-06

## 2024-08-06 NOTE — PROGRESS NOTES
Patient's urine culture from clinic yesterday positive for bacteria, calling her and cefuroxime x 5 days.  Asked her to hold the doxycycline which we prescribed for skin infection until she completes the cefuroxime.  She requested a Diflucan for recurrent vaginal yeast infection, 150 mg Diflucan sent as well for prevention.

## 2024-08-07 LAB — BACTERIA SPEC AEROBE CULT: ABNORMAL

## 2024-08-07 NOTE — PROGRESS NOTES
Please let the patient know the cefuroxime I prescribed from clinic should be suitable to clear her infection which has resulted positive for Klebsiella, thank you

## 2024-08-13 ENCOUNTER — OUTSIDE FACILITY SERVICE (OUTPATIENT)
Dept: GASTROENTEROLOGY | Facility: CLINIC | Age: 49
End: 2024-08-13
Payer: COMMERCIAL

## 2024-09-20 ENCOUNTER — TELEPHONE (OUTPATIENT)
Dept: UROLOGY | Facility: CLINIC | Age: 49
End: 2024-09-20
Payer: COMMERCIAL

## 2024-09-27 NOTE — TELEPHONE ENCOUNTER
"Caller: Radha Rahman \"Mona\"    Relationship: Self    Best call back number: [phone]    Requested Prescriptions:   Requested Prescriptions     Pending Prescriptions Disp Refills    omeprazole (priLOSEC) 40 MG capsule 60 capsule 1     Sig: Take 1 capsule by mouth 2 (Two) Times a Day.        Pharmacy where request should be sent: [facility] - [place] - [address] - [phone] [facility] [phone]      Last office visit with prescribing clinician: Visit date not found   Last telemedicine visit with prescribing clinician: Visit date not found   Next office visit with prescribing clinician: 12/3/2024     Additional details provided by patient: PATIENT IS OUT AND WOULD LIKE TO KNOW IF THE DOSAGE CAN BE INCREASED    Does the patient have less than a 3 day supply:  [x] Yes  [] No    Would you like a call back once the refill request has been completed: [] Yes [x] No    If the office needs to give you a call back, can they leave a voicemail: [] Yes [x] No    Surinder Chaudhari Rep   09/27/24 14:24 EDT         "

## 2024-09-30 RX ORDER — OMEPRAZOLE 40 MG/1
40 CAPSULE, DELAYED RELEASE ORAL 2 TIMES DAILY
Qty: 180 CAPSULE | Refills: 3 | Status: SHIPPED | OUTPATIENT
Start: 2024-09-30

## 2024-11-12 ENCOUNTER — TELEPHONE (OUTPATIENT)
Dept: GASTROENTEROLOGY | Facility: CLINIC | Age: 49
End: 2024-11-12

## 2024-11-12 NOTE — TELEPHONE ENCOUNTER
"  Hub staff attempted to follow warm transfer process and was unsuccessful     Caller: Radha Rahman \"Mona\"    Relationship to patient: Self    Best call back number: [phone]    Patient is needing: PATIENT NEEDS TO RESCHEDULE HER COLONOSCOPY THAT'S SCHEDULED FOR 11/15/24. PLEASE CALL AND RESCHEDULE      "

## 2025-01-14 ENCOUNTER — SPECIALTY PHARMACY (OUTPATIENT)
Dept: PHARMACY | Facility: TELEHEALTH | Age: 50
End: 2025-01-14
Payer: COMMERCIAL

## 2025-01-14 PROBLEM — G43.009 MIGRAINE WITHOUT AURA AND WITHOUT STATUS MIGRAINOSUS, NOT INTRACTABLE: Status: ACTIVE | Noted: 2025-01-14

## 2025-01-15 ENCOUNTER — SPECIALTY PHARMACY (OUTPATIENT)
Dept: PHARMACY | Facility: TELEHEALTH | Age: 50
End: 2025-01-15
Payer: COMMERCIAL

## 2025-01-16 ENCOUNTER — SPECIALTY PHARMACY (OUTPATIENT)
Dept: PHARMACY | Facility: TELEHEALTH | Age: 50
End: 2025-01-16
Payer: COMMERCIAL

## 2025-01-20 ENCOUNTER — SPECIALTY PHARMACY (OUTPATIENT)
Dept: PHARMACY | Facility: TELEHEALTH | Age: 50
End: 2025-01-20
Payer: COMMERCIAL

## 2025-01-20 NOTE — PROGRESS NOTES
Specialty Pharmacy Patient Management Program  Initial Assessment     Radha Rahman is a 49 y.o. female with chronic migraine and enrolled in the Patient Management program offered by [facility]. An initial outreach was conducted, including assessment of therapy appropriateness and specialty medication education for Nurtec 75 mg ODT. The patient was introduced to services offered by [facility], including: regular assessments, refill coordination, curbside pick-up or mail order delivery options, prior authorization maintenance, and financial assistance programs as applicable. The patient was also provided with contact information for the pharmacy team.     Insurance Coverage & Financial Support  Affirmed and  co-pay card      Relevant Past Medical History and Comorbidities  Relevant medical history and concomitant health conditions were discussed with the patient. The patient's chart has been reviewed for relevant past medical history and comorbid health conditions and updated as necessary.   Past Medical History:   Diagnosis Date    Arthritis     Wade esophagus     Wade syndrome     Bladder infection     Bronchitis     Emphysema lung     Fibromyalgia     GERD (gastroesophageal reflux disease)     Hernia     Hiatal hernia     Hypertension     Infectious viral hepatitis     Migraines     Ulcer      Social History     Socioeconomic History    Marital status: Single   Tobacco Use    Smoking status: Every Day     Types: Electronic Cigarette     Passive exposure: Current    Smokeless tobacco: Never   Vaping Use    Vaping status: Every Day   Substance and Sexual Activity    Alcohol use: No    Drug use: Yes     Types: Heroin     Comment: 01/2011    Sexual activity: Defer     Problem list reviewed by Isabel Gale, PharmD on 1/20/2025 at 11:30 AM    Allergies  Known allergies and reactions were discussed with the patient. The patient's chart has been  reviewed for allergy information and updated as necessary.   Bactrim [sulfamethoxazole-trimethoprim], Bupropion, and Stadol [butorphanol]  Allergies reviewed by Isabel Gale, PharmD on 1/20/2025 at 11:28 AM    Current Medication List  This medication list has been reviewed with the patient and evaluated for any interactions or necessary modifications/recommendations, and updated to include all prescription medications, OTC medications, and supplements the patient is currently taking. This list reflects what is contained in the patient's profile, which has also been marked as reviewed to communicate to other providers it is the most up to date version of the patient's current medication therapy.     Current Outpatient Medications:     amitriptyline (ELAVIL) 100 MG tablet, Take 1 tablet by mouth At Night As Needed., Disp: , Rfl:     amitriptyline (ELAVIL) 150 MG tablet, Take 1 tablet by mouth every night at bedtime., Disp: , Rfl:     amitriptyline (ELAVIL) 150 MG tablet, Take 1 tablet by mouth every night at bedtime., Disp: 30 tablet, Rfl: 4    amitriptyline (ELAVIL) 150 MG tablet, Take 1 tablet by mouth every night at bedtime., Disp: 30 tablet, Rfl: 4    ARIPiprazole (ABILIFY) 20 MG tablet, Take 1 tablet by mouth Daily., Disp: , Rfl:     ARIPiprazole (Abilify) 20 MG tablet, Take 1 tablet by mouth Daily., Disp: 30 tablet, Rfl: 4    ARIPiprazole (Abilify) 20 MG tablet, Take 1 tablet by mouth Daily., Disp: 30 tablet, Rfl: 4    ARIPiprazole (ABILIFY) 7.5 MG half tablet, Take 2 half tablet by mouth Daily., Disp: , Rfl:     Botox 200 units reconstituted solution, Inject 1 Units into the appropriate muscle as directed by prescriber Every 3 (Three) Months., Disp: , Rfl:     bumetanide (BUMEX) 1 MG tablet, Take 1 tablet by mouth Daily for Lower Extremity Edema., Disp: 90 tablet, Rfl: 0    buprenorphine-naloxone (SUBOXONE) 8-2 MG per SL tablet, Place 1 tablet under the tongue Daily., Disp: , Rfl:     buprenorphine-naloxone  (SUBOXONE) 8-2 MG per SL tablet, Place 2 tablets under the tongue Daily., Disp: 56 tablet, Rfl: 0    cefuroxime (CEFTIN) 500 MG tablet, Take 1 tablet by mouth 2 (Two) Times a Day., Disp: 10 tablet, Rfl: 0    ciprofloxacin (CIPRO) 500 MG tablet, TAKE ONE TABLET BY MOUTH TWICE DAILY FOR SEVEN DAYS, Disp: , Rfl:     clindamycin (CLINDAGEL) 1 % gel, , Disp: , Rfl:     estradiol (CLIMARA) 0.05 MG/24HR patch, Place 1 patch on the skin as directed by provider Every 7 (Seven) Days., Disp: , Rfl:     estradiol (VIVELLE-DOT) 0.1 MG/24HR patch, Place 1 patch on the skin as directed by provider 2 (Two) Times a Week., Disp: 8 patch, Rfl: 11    furosemide (LASIX) 20 MG tablet, Take 1 tablet by mouth Daily., Disp: , Rfl:     gabapentin (NEURONTIN) 800 MG tablet, Take 1 tablet by mouth 4 (Four) Times a Day., Disp: , Rfl:     hydrOXYzine (ATARAX) 50 MG tablet, , Disp: , Rfl:     hydrOXYzine (ATARAX) 50 MG tablet, Take 1 tablet by mouth 4 (Four) Times a Day., Disp: 120 tablet, Rfl: 4    hydrOXYzine (ATARAX) 50 MG tablet, Take 1 tablet by mouth 4 (Four) Times a Day., Disp: 120 tablet, Rfl: 4    imiquimod (ALDARA) 5 % cream, , Disp: , Rfl:     ipratropium-albuterol (COMBIVENT RESPIMAT)  MCG/ACT inhaler, Inhale 1 puff 4 (Four) Times a Day As Needed for Wheezing., Disp: , Rfl:     lamoTRIgine (LaMICtal) 200 MG tablet, Take 1 tablet by mouth Daily., Disp: , Rfl:     lamoTRIgine (LaMICtal) 200 MG tablet, Take 1 tablet by mouth Daily., Disp: 30 tablet, Rfl: 4    lamoTRIgine (LaMICtal) 200 MG tablet, Take 1 tablet by mouth Daily., Disp: 30 tablet, Rfl: 4    lamoTRIgine (LaMICtal) 25 MG tablet, Take 1 tablet by mouth Daily.TOTAL OF 225MG DAILY., Disp: 30 tablet, Rfl: 4    lamoTRIgine (LaMICtal) 25 MG tablet, Take 1 tablet by mouth Daily, for a total of 225mg daily, Disp: 90 tablet, Rfl: 4    lidocaine (LIDODERM) 5 %, Place 1 patch on the skin as directed by provider Daily. Remove & Discard patch within 12 hours or as directed by MD,  Disp: 3 each, Rfl: 0    losartan (COZAAR) 100 MG tablet, Take 1 tablet by mouth Daily., Disp: , Rfl:     lubiprostone (Amitiza) 24 MCG capsule, Take 1 capsule by mouth 2 (Two) Times a Day With Meals., Disp: 180 capsule, Rfl: 3    methocarbamol (ROBAXIN) 750 MG tablet, Take 1 tablet by mouth 3 (Three) Times a Day., Disp: 60 tablet, Rfl: 1    Methylnaltrexone Bromide (Relistor) 150 MG tablet, Take 3 tablets by mouth Daily. With water greater than 30 minutes prior to first meal, Disp: 90 tablet, Rfl: 11    metoclopramide (REGLAN) 10 MG tablet, Take 1 tablet by mouth 4 (Four) Times a Day Before Meals & at Bedtime., Disp: , Rfl:     mupirocin (BACTROBAN) 2 % ointment, , Disp: , Rfl:     Naldemedine Tosylate (Symproic) 0.2 MG tablet, Take 0.2 mg by mouth Daily., Disp: 30 tablet, Rfl: 11    Naloxegol Oxalate (Movantik) 25 MG tablet, Take 1 tablet by mouth daily, Disp: 90 tablet, Rfl: 3    omeprazole (priLOSEC) 40 MG capsule, Take 1 capsule by mouth 2 (Two) Times a Day., Disp: 180 capsule, Rfl: 3    omeprazole (priLOSEC) 40 MG capsule, Take 1 capsule by mouth 2 (Two) Times a Day 30 to 60 Minutes Before Meals., Disp: 180 capsule, Rfl: 3    ondansetron ODT (ZOFRAN-ODT) 8 MG disintegrating tablet, , Disp: , Rfl:     phenazopyridine (PYRIDIUM) 100 MG tablet, TAKE TWO TABLETS BY MOUTH THREE TIMES DAILY FOR TWO DAYS, Disp: , Rfl:     Plecanatide (Trulance) 3 MG tablet, Take 1 tablet by mouth Daily., Disp: 90 tablet, Rfl: 3    promethazine (PHENERGAN) 25 MG tablet, Take 1 tablet by mouth Every 6 (Six) Hours As Needed for Nausea or Vomiting., Disp: , Rfl:     Rimegepant Sulfate (Nurtec) 75 MG tablet dispersible tablet, Take 1 tablet by mouth., Disp: , Rfl:     rimegepant sulfate (Nurtec) 75 MG tablet, Take 1 tablet by mouth Daily As Needed for migraine. max 1 tablet per 24 hours, Disp: 24 tablet, Rfl: 3    SUMAtriptan (IMITREX) 100 MG tablet, Take 1 tablet by mouth 1 (One) Time As Needed for Migraine., Disp: , Rfl:     SUMAtriptan  "(IMITREX) 100 MG tablet, Take 1 tablet by mouth at onset of migraine. May repeat dose in 2 hours if needed. Max 2 tabs/24 hours., Disp: 27 tablet, Rfl: 3    ubrogepant 100 MG tablet, Take 1 tablet by mouth As Needed., Disp: , Rfl:     VENTOLIN  (90 Base) MCG/ACT inhaler, , Disp: , Rfl:   Medicines reviewed by Isabel Gale, Yash on 1/20/2025 at 11:30 AM    Drug Interactions  none     Relevant Laboratory Values  No results found for: \"GLUCOSE\", \"CALCIUM\", \"NA\", \"K\", \"CO2\", \"CL\", \"BUN\", \"CREATININE\", \"EGFRRESULT\", \"BCR\", \"ANIONGAP\"  No results found for: \"WBC\", \"HGB\", \"HCT\", \"MCV\", \"PLT\", \"INR\"  Lab Value Review  The above lab values have been reviewed; the following specialty medication(s) dose adjustment(s) are recommended: none.    Initial Education Provided for Specialty Medication  The patient has been provided with the following education and any applicable administration techniques (i.e. self-injection) have been demonstrated for the therapies indicated. All questions and concerns have been addressed prior to the patient receiving the medication, and the patient has verbalized understanding of the education and any materials provided. Additional patient education shall be provided and documented upon request by the patient, provider or payer.      Nurtec (rimegepant)  Medication Expectations   Why am I taking this medication? You are taking this medication for migraine prophylaxis or to treat an acute migraine.   What should I expect while on this medication? You should expect to see a decrease in the frequency and severity of your migraines.   How does the medication work? Nurtec is a monoclonal antibody that binds to calcitonin gene-related peptide (CGRP) and blocks its binding to the receptor decreasing the severity of migraines.   How long will I be on this medication for? The amount of time you will be on this medication will be determined by your doctor and your response to the medication.  "   How do I take this medication? Take as directed on your prescription label.   What are some possible side effects? Potential side effects including, but not limited to nausea. Pt verbalized understanding.   What happens if I miss a dose? Take the missed dose as soon as possible, and resume the every other day timed from the last dose..     Medication Safety   What are things I should warn my doctor immediately about? Hypersensitivity reactions - trouble breathing or swallowing.   What are things that I should be cautious of? Hypersensitivity reactions (eg, dyspnea, rash), including delayed serious reactions, have occurred; discontinue use if suspected    What are some medications that can interact with this one? Avoid concomitant administration of Nurtec ODT with strong inhibitors of CY, strong or moderate inducers of CYP3A or inhibitors of P-gp or BCRP. Avoid another dose of Nurtec ODT within 48 hours when it is administered with moderate inhibitors of CY.  Ask your pharmacist or health care provider before starting new medications     Medication Storage/Handling   How should I handle this medication? Keep this medication out of reach of pets/children in original container. Ensure hands are dry before opening blister pack.   How does this medication need to be stored? Store at room temperature away from heat/cold, sunlight or moisture   How should I dispose of this medication? There should not be a need to dispose of this medication unless your provider decides to change the dose or therapy. If that is the case, take to your local police station for proper disposal. Some pharmacies also have take-back bins for medication drop-off.      Resources/Support   How can I remind myself to take this medication? You can download reminder apps to help you manage your refills. You may also set an alarm on your phone to remind you. The pharmacy carries pill boxes that you can place next to an area you pass everyday  (such as where you place your car keys or where you charge your phone)   Is financial support available?  Yes, [employer] can provide co-pay cards if you have commercial insurance or patient assistance if you have Medicare or no insurance.    Which vaccines are recommended for me? Talk to your doctor about these vaccines: Flu, Coronavirus (COVID-19), Pneumococcal (pneumonia), Tdap, Hepatitis B, Zoster (shingles)          Adherence, Self-Administration, and Current Therapy Problems  Adherence related to the patient's specialty therapy was discussed with the patient. The Adherence segment of this outreach has been reviewed and updated.          Additional Barriers to Patient Self-Administration: None, patient has used previously. First fill managed by call center.  Methods for Supporting Patient Self-Administration: N/A    Open Medication Therapy Problems  No medication therapy recommendations to display    Goals of Therapy   Goals Addressed Today        Specialty Pharmacy General Goal      Decrease frequency, severity and duration of migraine attacks.               Reassessment Plan & Follow-Up  Medication Therapy Changes: First fill managed by call center. Patient to continue Nurtec for acute treatment.   Additional Plans, Therapy Recommendations, or Therapy Problems to Be Addressed:  We were able to get Nurtec PA approved due to patient failing Ubrelvy.    Pharmacist to perform regular reassessments no more than (6) months from the previous assessment.  Welcome information and patient satisfaction survey to be sent by retail team with patient's initial fill.  Care Coordinator to set up future refill outreaches, coordinate prescription delivery, and escalate clinical questions to pharmacist.     Attestation  I attest the patient was actively involved in and has agreed to the above plan of care. I attest that the initiated specialty medication(s) are appropriate for the patient based on my assessment.  If the prescribed therapy is at any point deemed not appropriate based on the current or future assessments, a consultation will be initiated with the patient's specialty care provider to determine the best course of action. The revised plan of therapy will be documented along with any reassessments and/or additional patient education provided.     Electronically signed by Isabel Gale PharmD, 01/20/25, 11:30 AM EST.

## 2025-02-12 ENCOUNTER — TELEPHONE (OUTPATIENT)
Dept: GASTROENTEROLOGY | Facility: CLINIC | Age: 50
End: 2025-02-12
Payer: COMMERCIAL

## 2025-02-12 ENCOUNTER — SPECIALTY PHARMACY (OUTPATIENT)
Dept: PHARMACY | Facility: TELEHEALTH | Age: 50
End: 2025-02-12
Payer: COMMERCIAL

## 2025-02-12 NOTE — PROGRESS NOTES
"   Specialty Pharmacy Patient Management Program  Refill Outreach     Radha \"Mona\" was contacted today regarding refills of their medication(s).    Refill Questions      Flowsheet Row Most Recent Value   Changes to allergies? No   Changes to medications? No   New conditions or infections since last clinic visit No   Unplanned office visit, urgent care, ED, or hospital admission in the last 4 weeks  No   How does patient/caregiver feel medication is working? Good   Financial problems or insurance changes  No   Since the previous refill, were any specialty medication doses or scheduled injections missed or delayed?  No   Does this patient require a clinical escalation to a pharmacist? No            Delivery Questions      Flowsheet Row Most Recent Value   Delivery method UPS   Delivery address verified with patient/caregiver? Yes   Delivery address Home   Number of medications in delivery 1   Medication(s) being filled and delivered Rimegepant Sulfate (Nurtec)   Doses left of specialty medications 2 tabs   Copay verified? Yes   Copay amount $0   Copay form of payment No copayment ($0)   Ship Date 2/12/25   Delivery Date Selection 02/13/25   Signature Required No                 Follow-up: 21 day(s)     Sonia Santos, Pharmacy Technician  2/12/2025  11:10 EST    "

## 2025-02-15 ENCOUNTER — HOSPITAL ENCOUNTER (EMERGENCY)
Facility: HOSPITAL | Age: 50
Discharge: HOME | End: 2025-02-16
Attending: STUDENT IN AN ORGANIZED HEALTH CARE EDUCATION/TRAINING PROGRAM
Payer: COMMERCIAL

## 2025-02-15 ENCOUNTER — APPOINTMENT (OUTPATIENT)
Dept: ULTRASOUND IMAGING | Facility: HOSPITAL | Age: 50
End: 2025-02-15
Payer: COMMERCIAL

## 2025-02-15 ENCOUNTER — APPOINTMENT (OUTPATIENT)
Dept: GENERAL RADIOLOGY | Facility: HOSPITAL | Age: 50
End: 2025-02-15
Payer: COMMERCIAL

## 2025-02-15 VITALS
TEMPERATURE: 98.2 F | OXYGEN SATURATION: 95 % | SYSTOLIC BLOOD PRESSURE: 120 MMHG | BODY MASS INDEX: 36.16 KG/M2 | RESPIRATION RATE: 16 BRPM | WEIGHT: 225 LBS | DIASTOLIC BLOOD PRESSURE: 84 MMHG | HEIGHT: 66 IN | HEART RATE: 108 BPM

## 2025-02-15 DIAGNOSIS — M79.604 PAIN AND SWELLING OF RIGHT LOWER EXTREMITY: Primary | ICD-10-CM

## 2025-02-15 DIAGNOSIS — M79.89 PAIN AND SWELLING OF RIGHT LOWER EXTREMITY: Primary | ICD-10-CM

## 2025-02-15 PROCEDURE — 99284 EMERGENCY DEPT VISIT MOD MDM: CPT | Performed by: STUDENT IN AN ORGANIZED HEALTH CARE EDUCATION/TRAINING PROGRAM

## 2025-02-15 PROCEDURE — 73590 X-RAY EXAM OF LOWER LEG: CPT

## 2025-02-15 PROCEDURE — 93971 EXTREMITY STUDY: CPT

## 2025-02-16 NOTE — ED PROVIDER NOTES
" EMERGENCY DEPARTMENT ENCOUNTER    Pt Name: Radha Rahman  MRN: [id number]  Pt :   1975  Room Number:    Date of encounter:  2/15/2025  PCP: Sammy Lopez MD  ED Provider: Cash Thurston PA-C    Historian: Patient      HPI:  Chief Complaint   Patient presents with    Leg Pain          Context: Radha Rahman is a 50 y.o. female who presents to the ED c/o pain and swelling to right lower extremity.  Patient states 6 months ago had an ultrasound showing \"reflux\" in her right lower extremity and was recommended that she have a surgery but is yet to do it due to being too busy.  No history of DVT or PE.  States for the past 2 weeks has had pain and swelling to the right lower extremity below the knee.  No calf pain or tenderness has tenderness and notable swelling more so to the right anterolateral lower leg and lateral ankle.  No known trauma.      PAST MEDICAL HISTORY  Past Medical History:   Diagnosis Date    Arthritis     Wade esophagus     Wade syndrome     Bladder infection     Bronchitis     Emphysema lung     Fibromyalgia     GERD (gastroesophageal reflux disease)     Hernia     Hiatal hernia     Hypertension     Infectious viral hepatitis     Migraines     Ulcer          PAST SURGICAL HISTORY  Past Surgical History:   Procedure Laterality Date     SECTION      COLONOSCOPY      ERCP      GALLBLADDER SURGERY      OOPHORECTOMY      UPPER GASTROINTESTINAL ENDOSCOPY           FAMILY HISTORY  Family History   Problem Relation Age of Onset    Cancer Mother     Arthritis Mother     Breast cancer Mother     Heart disease Father     Cancer Father     Diabetes Father     Breast cancer Maternal Aunt     Diabetes Paternal Grandmother     Colon cancer Neg Hx          SOCIAL HISTORY  Social History     Socioeconomic History    Marital status: Single   Tobacco Use    Smoking status: Every Day     Types: Electronic Cigarette     Passive exposure: Current    Smokeless tobacco: " Never   Vaping Use    Vaping status: Every Day   Substance and Sexual Activity    Alcohol use: No    Drug use: Yes     Types: Heroin     Comment: 01/2011    Sexual activity: Defer         ALLERGIES  Bactrim [sulfamethoxazole-trimethoprim], Bupropion, and Stadol [butorphanol]        REVIEW OF SYSTEMS  Review of Systems   Constitutional: Negative.    HENT: Negative.     Eyes: Negative.    Respiratory: Negative.     Cardiovascular: Negative.    Gastrointestinal: Negative.    Genitourinary: Negative.    Musculoskeletal:         Right lower leg pain and swelling   Skin: Negative.    Neurological: Negative.    Psychiatric/Behavioral: Negative.          All systems reviewed and negative except for those discussed in HPI.       PHYSICAL EXAM    I have reviewed the triage vital signs and nursing notes.    ED Triage Vitals [02/15/25 2204]   Temp Heart Rate Resp BP SpO2   98.2 °F (36.8 °C) 108 16 (!) 161/101 95 %      Temp src Heart Rate Source Patient Position BP Location FiO2 (%)   Oral Monitor Sitting Left arm --       Physical Exam  Vitals and nursing note reviewed.   Constitutional:       General: She is not in acute distress.     Appearance: Normal appearance. She is obese. She is not ill-appearing, toxic-appearing or diaphoretic.   HENT:      Head: Normocephalic and atraumatic.      Nose: Nose normal.   Eyes:      Extraocular Movements: Extraocular movements intact.   Cardiovascular:      Rate and Rhythm: Normal rate and regular rhythm.      Pulses: Normal pulses.      Comments: 2+ DP pulse bilaterally  Pulmonary:      Effort: Pulmonary effort is normal.   Abdominal:      General: Abdomen is flat.   Musculoskeletal:      Cervical back: Normal range of motion.        Legs:       Comments: Edema to the right lateral lower leg, 2+ DP pulse on the right lower extremity.  Right foot is well-perfused and warm with brisk capillary refill.  No cellulitis.  No calf tenderness.   Skin:     General: Skin is warm and dry.    Neurological:      General: No focal deficit present.      Mental Status: She is alert. Mental status is at baseline.   Psychiatric:         Mood and Affect: Mood normal.         Behavior: Behavior normal.            LAB RESULTS  No results found for this or any previous visit (from the past 24 hours).    If labs were ordered, I independently reviewed the results and considered them in treating the patient.        RADIOLOGY  US Venous Doppler Lower Extremity Right (duplex)    Result Date: 2/16/2025  FINAL REPORT TECHNIQUE: null CLINICAL HISTORY: right lower leg swelling/pain COMPARISON: null FINDINGS: Venous duplex ultrasound right lower extremity Comparison: None Findings: The visualized deep veins are fully compressible with normal Doppler color flow and spectral tracings. No popliteal cyst. There is a small fluid collection within the right anterior leg in the area of the patient's pain measuring 1.8 x 2 x 0.5 cm. There is no surrounding hyperemia.     IMPRESSION: 1. Negative for right lower extremity deep vein thrombosis. 2. Small fluid collection in the right anterior leg in the area of the patient's pain possibly posttraumatic seroma or subacute to chronic hematoma. Patient states she fell a few weeks ago with possible injury in this region. Authenticated and Electronically Signed by Rebel Lenz MD on 02/16/2025 12:06:30 AM         PROCEDURES    Procedures    Interpretations    O2 Sat: The patient's oxygen saturation was 95% on Room Air.  This was independently interpreted by me as Normal    Radiology: I ordered and independently reviewed the above noted radiographic studies.  I viewed images of Xray of the right tib-fib  which showed No fracture per my independent interpretation. See radiologist's dictation for official interpretation.     MEDICATIONS GIVEN IN ER    Medications - No data to display      MEDICAL DECISION MAKING, PROGRESS, and CONSULTS    All labs, if obtained, have been independently  reviewed by me.  All radiology studies, if obtained, have been reviewed by me and the radiologist dictating the report.  All EKG's, if obtained, have been independently viewed and interpreted by me      Discussion below represents my analysis of pertinent findings related to patient's condition, differential diagnosis, treatment plan and final disposition.      Differential diagnosis:    DVT, cellulitis, lymphedema    Additional Sources:  None      Orders placed during this visit:  Orders Placed This Encounter   Procedures    XR Tibia Fibula 2 View Right    US Venous Doppler Lower Extremity Right (duplex)         Additional orders considered but not ordered:  None    ED Course:    Consultants:  None    ED Course as of 02/16/25 0013   Sun Feb 16, 2025   0012 Ultrasound per radiologist shows no sign of DVT, findings consistent with posttraumatic seroma or subacute chronic hematoma in the area of patient's pain right anterolateral lower leg.  She has a strong 2+ DP pulse well-perfused lower extremity will have her follow-up outpatient return for any new or worsening symptoms [TM]      ED Course User Index  [TM] Cash Thurston PA-C           After my consideration of clinical presentation and any laboratory/radiology studies obtained, I discussed the findings with the patient/patient representative who is in agreement with the treatment plan and the final disposition. Risks and benefits of discharge were discussed.     AS OF 00:13 EST VITALS:    BP - 120/84  HR - 108  TEMP - 98.2 °F (36.8 °C) (Oral)  O2 SATS - 95%    I reviewed the patient's prescription monitoring report if available prior to discharge    DIAGNOSIS  Final diagnoses:   Pain and swelling of right lower extremity         DISPOSITION  ED Disposition       ED Disposition   Discharge    Condition   Stable    Comment   --                   Please note that portions of this document were completed with voice recognition software.         Cash Thurston PA-C  02/16/25 0013

## 2025-02-28 RX ORDER — OMEPRAZOLE 40 MG/1
40 CAPSULE, DELAYED RELEASE ORAL 2 TIMES DAILY
Qty: 180 CAPSULE | Refills: 3 | Status: SHIPPED | OUTPATIENT
Start: 2025-02-28

## 2025-03-07 ENCOUNTER — SPECIALTY PHARMACY (OUTPATIENT)
Dept: PHARMACY | Facility: TELEHEALTH | Age: 50
End: 2025-03-07
Payer: COMMERCIAL

## 2025-03-10 ENCOUNTER — SPECIALTY PHARMACY (OUTPATIENT)
Dept: PHARMACY | Facility: TELEHEALTH | Age: 50
End: 2025-03-10
Payer: COMMERCIAL

## 2025-04-09 ENCOUNTER — SPECIALTY PHARMACY (OUTPATIENT)
Dept: PHARMACY | Facility: TELEHEALTH | Age: 50
End: 2025-04-09
Payer: COMMERCIAL

## 2025-04-09 NOTE — PROGRESS NOTES
"   Specialty Pharmacy Patient Management Program  Refill Outreach     Radah \"Mona\" was contacted today regarding refills of their medication(s).    Refill Questions      Flowsheet Row Most Recent Value   Changes to allergies? No   Changes to medications? No   New conditions or infections since last clinic visit No   Unplanned office visit, urgent care, ED, or hospital admission in the last 4 weeks  No   How does patient/caregiver feel medication is working? Good   Financial problems or insurance changes  No   Since the previous refill, were any specialty medication doses or scheduled injections missed or delayed?  No   Does this patient require a clinical escalation to a pharmacist? No            Delivery Questions      Flowsheet Row Most Recent Value   Delivery method UPS   Delivery address verified with patient/caregiver? Yes   Delivery address Home   Other address preferred n/a   Number of medications in delivery 1   Medication(s) being filled and delivered Rimegepant Sulfate (Nurtec)   Doses left of specialty medications 3 tabs   Copay verified? Yes   Copay amount $0   Copay form of payment No copayment ($0)   Delivery Date Selection 04/10/25   Signature Required No   Do you consent to receive electronic handouts?  No                 Follow-up: 21 day(s)     Sonia Santos, Pharmacy Technician  4/9/2025  11:00 EDT    "

## 2025-05-05 ENCOUNTER — SPECIALTY PHARMACY (OUTPATIENT)
Dept: PHARMACY | Facility: TELEHEALTH | Age: 50
End: 2025-05-05
Payer: COMMERCIAL

## 2025-05-05 NOTE — PROGRESS NOTES
"   Specialty Pharmacy Patient Management Program  Refill Outreach     Radha \"Mona\" was contacted today regarding refills of their medication(s).    Refill Questions      Flowsheet Row Most Recent Value   Changes to allergies? No   Changes to medications? No   New conditions or infections since last clinic visit No   Unplanned office visit, urgent care, ED, or hospital admission in the last 4 weeks  No   How does patient/caregiver feel medication is working? Very good   Financial problems or insurance changes  No   Since the previous refill, were any specialty medication doses or scheduled injections missed or delayed?  No   Does this patient require a clinical escalation to a pharmacist? No            Delivery Questions      Flowsheet Row Most Recent Value   Delivery method UPS   Delivery address verified with patient/caregiver? Yes   Delivery address Home   Other address preferred n/a   Number of medications in delivery 1   Medication(s) being filled and delivered Rimegepant Sulfate (Nurtec)   Doses left of specialty medications 3 tabs   Copay verified? No   Copay amount $0   Copay form of payment No copayment ($0)   Delivery Date Selection 05/06/25   Signature Required No   Do you consent to receive electronic handouts?  No                 Follow-up: 21 day(s)     Sonia Santos, Pharmacy Technician  5/5/2025  10:11 EDT    "

## 2025-06-13 ENCOUNTER — TELEPHONE (OUTPATIENT)
Dept: GASTROENTEROLOGY | Facility: CLINIC | Age: 50
End: 2025-06-13

## 2025-06-13 NOTE — TELEPHONE ENCOUNTER
"Caller: Radha Rahman \"Mona\"    Relationship: Self    Best call back number: [phone]    Requested Prescriptions: NALOXEGOL OXALATE (MOVANTIK) 25 MG TABLET    PLECANATIDE (TRULANCE) 3 MG TABLET  Requested Prescriptions      No prescriptions requested or ordered in this encounter        Pharmacy where request should be sent: [facility]  [address] [place] [address]         Last office visit with prescribing clinician: 5/10/2024   Last telemedicine visit with prescribing clinician: Visit date not found   Next office visit with prescribing clinician: Visit date not found     Additional details provided by patient: PT IS COMPLETELY OUT OF BOTH MEDICATIONS    Does the patient have less than a 3 day supply:  [x] Yes  [] No    Would you like a call back once the refill request has been completed: [] Yes [x] No    If the office needs to give you a call back, can they leave a voicemail: [x] Yes [] No    Surinder Hsu   06/13/25 09:27 EDT       "

## 2025-06-18 ENCOUNTER — PATIENT MESSAGE (OUTPATIENT)
Dept: GASTROENTEROLOGY | Facility: CLINIC | Age: 50
End: 2025-06-18
Payer: COMMERCIAL

## 2025-06-18 DIAGNOSIS — K59.03 THERAPEUTIC OPIOID INDUCED CONSTIPATION: Primary | ICD-10-CM

## 2025-06-18 DIAGNOSIS — T40.2X5A THERAPEUTIC OPIOID INDUCED CONSTIPATION: Primary | ICD-10-CM

## 2025-06-19 ENCOUNTER — DOCUMENTATION (OUTPATIENT)
Dept: GASTROENTEROLOGY | Facility: CLINIC | Age: 50
End: 2025-06-19
Payer: COMMERCIAL

## 2025-06-19 DIAGNOSIS — K59.04 CHRONIC IDIOPATHIC CONSTIPATION: Primary | ICD-10-CM

## 2025-06-19 NOTE — PROGRESS NOTES
I spoke to patient via telephone after reviewing message about Movantik, Trulance, magnesium supplements.     Patient has been out of Movantik and Trulance for 2 to 3 weeks.     She has not received Movantik from the pharmacy. Movantik might need a PA (will request Damaris check on this information)    She has not previously taken Movtanik and Miralax.     She has not previously tried MagO7, oxypowder, nor Calm.     While awaiting decision regarding Movantik from insurance recommend she mix 5 doses of MiraLAX and 32 ounce electrolyte or sports drink and drink 8 ounce of mixture every 30 minutes until gone. If not helpful, recommend 8 ounce glass of GoLytely every 30 minutes for 2 hours (4 glasses) and wait 6 hours if good results she does not have to repeat with second liter of GoLytely.  If she does not have good results recommend second, third and possibly fourth liter of GoLytely as needed    If she is able to start Movantik recommend daily dose of Movantik.  If she does not have a productive bowel movement 4 hours later take dose of MiraLAX.  If she does not have a productive bowel movement 4 hours later consider second dose of MiraLAX followed by third dose of MiraLAX or magnesium supplement as listed above.    Milk of magnesia previously caused diarrhea and perineal irritation.  Recommend she try lowest dose of magnesium supplement and gradually increase dose at bedtime as tolerated/as needed    I also recommended follow up visit. Notified Sidra to schedule follow up visit.

## 2025-06-23 NOTE — PROGRESS NOTES
INO Goff received for Movantik. Information for determination faxed to [facility]. Awaiting decision.

## 2025-06-30 ENCOUNTER — SPECIALTY PHARMACY (OUTPATIENT)
Dept: PHARMACY | Facility: TELEHEALTH | Age: 50
End: 2025-06-30
Payer: COMMERCIAL

## 2025-06-30 NOTE — PROGRESS NOTES
Specialty Pharmacy Patient Management Program  Reassessment     Radha Rahman is a 50 y.o. female with chronic migraine and enrolled in the Patient Management program offered by [facility]. A follow-up outreach was conducted, including assessment of continued therapy appropriateness, medication adherence, and side effect incidence and management for Nurtec 75 mg ODT.     Changes to Insurance Coverage or Financial Support  Affirmed and  copay card    Relevant Past Medical History and Comorbidities  Relevant medical history and concomitant health conditions were discussed with the patient. The patient's chart has been reviewed for relevant past medical history and comorbid health conditions and updated as necessary.   Past Medical History:   Diagnosis Date    Arthritis     Wade esophagus     Wade syndrome     Bladder infection     Bronchitis     Emphysema lung     Fibromyalgia     GERD (gastroesophageal reflux disease)     Hernia     Hiatal hernia     Hypertension     Infectious viral hepatitis     Migraines     Ulcer      Social History     Socioeconomic History    Marital status: Single   Tobacco Use    Smoking status: Every Day     Types: Electronic Cigarette     Passive exposure: Current    Smokeless tobacco: Never   Vaping Use    Vaping status: Every Day   Substance and Sexual Activity    Alcohol use: No    Drug use: Yes     Types: Heroin     Comment: 01/2011    Sexual activity: Defer     Problem list reviewed by Isabel Gale, PharmD on 6/30/2025 at 10:06 AM    Allergies  Known allergies and reactions were discussed with the patient. The patient's chart has been reviewed for allergy information and updated as necessary.   Allergies   Allergen Reactions    Bactrim [Sulfamethoxazole-Trimethoprim]     Bupropion Other (See Comments)     seizures    Stadol [Butorphanol]      Allergies reviewed by Isabel Gale, PharmD on 6/30/2025 at 10:06 AM    Relevant Laboratory  "Values  No results found for: \"GLUCOSE\", \"CALCIUM\", \"NA\", \"K\", \"CO2\", \"CL\", \"BUN\", \"CREATININE\", \"EGFRRESULT\", \"BCR\", \"ANIONGAP\"  No results found for: \"WBC\", \"HGB\", \"HCT\", \"MCV\", \"PLT\", \"INR\"  Lab Value Review  The above lab values have been reviewed; the following specialty medication(s) dose adjustment(s) are recommended: none.    Current Medication List  This medication list has been reviewed with the patient and evaluated for any interactions or necessary modifications/recommendations, and updated to include all prescription medications, OTC medications, and supplements the patient is currently taking. This list reflects what is contained in the patient's profile, which has also been marked as reviewed to communicate to other providers it is the most up to date version of the patient's current medication therapy.     Current Outpatient Medications:     albuterol sulfate  (90 Base) MCG/ACT inhaler, Inhale 2 puffs Every 6 (Six) Hours As Needed for wheezing, Disp: 8.5 g, Rfl: 11    amitriptyline (ELAVIL) 100 MG tablet, Take 1 tablet by mouth At Night As Needed., Disp: , Rfl:     amitriptyline (ELAVIL) 150 MG tablet, Take 1 tablet by mouth every night at bedtime., Disp: , Rfl:     amitriptyline (ELAVIL) 150 MG tablet, Take 1 tablet by mouth every night at bedtime., Disp: 30 tablet, Rfl: 4    amitriptyline (ELAVIL) 150 MG tablet, Take 1 tablet by mouth every night at bedtime., Disp: 30 tablet, Rfl: 4    amphetamine-dextroamphetamine (Adderall) 10 MG tablet, Take 1 tablet by mouth every morning and 1 tablet every afternoon., Disp: 60 tablet, Rfl: 0    amphetamine-dextroamphetamine (Adderall) 20 MG tablet, Take 1 tablet by mouth 2 (Two) Times a Day in the morning and at noon, Disp: 60 tablet, Rfl: 0    ARIPiprazole (ABILIFY) 20 MG tablet, Take 1 tablet by mouth Daily., Disp: , Rfl:     ARIPiprazole (Abilify) 20 MG tablet, Take 1 tablet by mouth Daily., Disp: 30 tablet, Rfl: 4    ARIPiprazole (Abilify) 20 MG " tablet, Take 1 tablet by mouth Daily., Disp: 30 tablet, Rfl: 4    ARIPiprazole (ABILIFY) 7.5 MG half tablet, Take 2 half tablet by mouth Daily., Disp: , Rfl:     Botox 200 units reconstituted solution, Inject 1 Units into the appropriate muscle as directed by prescriber Every 3 (Three) Months., Disp: , Rfl:     bumetanide (BUMEX) 1 MG tablet, Take 1 tablet by mouth Daily for Lower Extremity Edema., Disp: 90 tablet, Rfl: 0    buprenorphine-naloxone (SUBOXONE) 8-2 MG per SL tablet, Place 1 tablet under the tongue Daily., Disp: , Rfl:     buprenorphine-naloxone (SUBOXONE) 8-2 MG per SL tablet, Place 2 tablets under the tongue Daily., Disp: 56 tablet, Rfl: 0    cefuroxime (CEFTIN) 500 MG tablet, Take 1 tablet by mouth 2 (Two) Times a Day., Disp: 10 tablet, Rfl: 0    ciprofloxacin (CIPRO) 500 MG tablet, TAKE ONE TABLET BY MOUTH TWICE DAILY FOR SEVEN DAYS, Disp: , Rfl:     clindamycin (CLINDAGEL) 1 % gel, , Disp: , Rfl:     estradiol (CLIMARA) 0.05 MG/24HR patch, Place 1 patch on the skin as directed by provider Every 7 (Seven) Days., Disp: , Rfl:     estradiol (VIVELLE-DOT) 0.1 MG/24HR patch, Place 1 patch on the skin as directed by provider 2 (Two) Times a Week., Disp: 8 patch, Rfl: 11    furosemide (LASIX) 20 MG tablet, Take 1 tablet by mouth Daily., Disp: , Rfl:     gabapentin (NEURONTIN) 800 MG tablet, Take 1 tablet by mouth 4 (Four) Times a Day., Disp: , Rfl:     gabapentin (Neurontin) 800 MG tablet, Take 1 tablet by mouth 4 (Four) Times a Day., Disp: 120 tablet, Rfl: 0    gabapentin (Neurontin) 800 MG tablet, Take 1 tablet by mouth 4 (Four) Times a Day., Disp: 120 tablet, Rfl: 0    gabapentin (Neurontin) 800 MG tablet, Take 1 tablet by mouth 4 (Four) Times a Day., Disp: 120 tablet, Rfl: 1    gabapentin (NEURONTIN) 800 MG tablet, Take 1 tablet by mouth 4 (Four) Times a Day., Disp: 120 tablet, Rfl: 0    gabapentin (Neurontin) 800 MG tablet, Take 1 tablet by mouth 4 (Four) Times a Day., Disp: 120 tablet, Rfl: 0     gabapentin (NEURONTIN) 800 MG tablet, Take 1 tablet by mouth 4 (Four) Times a Day., Disp: 120 tablet, Rfl: 0    ibuprofen (ADVIL,MOTRIN) 800 MG tablet, Take 1 tablet by mouth Every 6 (Six) Hours As Needed for pain up to 10 days., Disp: 40 tablet, Rfl: 0    imiquimod (ALDARA) 5 % cream, , Disp: , Rfl:     ipratropium-albuterol (COMBIVENT RESPIMAT)  MCG/ACT inhaler, Inhale 1 puff 4 (Four) Times a Day As Needed for Wheezing., Disp: , Rfl:     lamoTRIgine (LaMICtal) 200 MG tablet, Take 1 tablet by mouth Daily., Disp: , Rfl:     lamoTRIgine (LaMICtal) 200 MG tablet, Take 1 tablet by mouth Daily., Disp: 30 tablet, Rfl: 4    lamoTRIgine (LaMICtal) 200 MG tablet, Take 1 tablet by mouth Daily., Disp: 30 tablet, Rfl: 4    lamoTRIgine (LaMICtal) 25 MG tablet, Take 1 tablet by mouth Daily.TOTAL OF 225MG DAILY., Disp: 30 tablet, Rfl: 4    lamoTRIgine (LaMICtal) 25 MG tablet, Take 1 tablet by mouth Daily, for a total of 225mg daily, Disp: 90 tablet, Rfl: 4    lidocaine (LIDODERM) 5 %, Place 1 patch on the skin as directed by provider Daily. Remove & Discard patch within 12 hours or as directed by MD, Disp: 3 each, Rfl: 0    losartan (COZAAR) 100 MG tablet, Take 1 tablet by mouth Daily., Disp: , Rfl:     lubiprostone (Amitiza) 24 MCG capsule, Take 1 capsule by mouth 2 (Two) Times a Day With Meals., Disp: 180 capsule, Rfl: 3    methocarbamol (ROBAXIN) 750 MG tablet, Take 1 tablet by mouth 3 (Three) Times a Day., Disp: 60 tablet, Rfl: 1    methocarbamol (ROBAXIN) 750 MG tablet, Take 1 tablet by mouth 4 (Four) Times a Day., Disp: 120 tablet, Rfl: 1    methocarbamol (ROBAXIN) 750 MG tablet, Take 1 tablet by mouth 4 (Four) Times a Day., Disp: 120 tablet, Rfl: 5    methocarbamol (ROBAXIN) 750 MG tablet, Take 1 tablet by mouth 4 (Four) Times a Day., Disp: 120 tablet, Rfl: 1    methocarbamol (ROBAXIN) 750 MG tablet, Take 1 tablet by mouth 4 (Four) Times a Day., Disp: 120 tablet, Rfl: 1    metoclopramide (REGLAN) 10 MG tablet, Take 1  tablet by mouth 4 (Four) Times a Day Before Meals & at Bedtime., Disp: , Rfl:     mupirocin (BACTROBAN) 2 % ointment, , Disp: , Rfl:     Naloxegol Oxalate (Movantik) 25 MG tablet, Take 1 tablet by mouth daily, Disp: 90 tablet, Rfl: 3    Naloxegol Oxalate (MOVANTIK) 25 MG tablet, Take 1 tablet by mouth Every Morning. 1 hour before or 2 hours after a meal, Disp: 90 tablet, Rfl: 3    omeprazole (priLOSEC) 40 MG capsule, Take 1 capsule by mouth 2 (Two) Times a Day 30 to 60 Minutes Before Meals., Disp: 180 capsule, Rfl: 3    omeprazole (priLOSEC) 40 MG capsule, Take 1 capsule by mouth 2 (Two) Times a Day., Disp: 180 capsule, Rfl: 3    ondansetron ODT (ZOFRAN-ODT) 8 MG disintegrating tablet, , Disp: , Rfl:     phenazopyridine (PYRIDIUM) 100 MG tablet, TAKE TWO TABLETS BY MOUTH THREE TIMES DAILY FOR TWO DAYS, Disp: , Rfl:     Plecanatide (Trulance) 3 MG tablet, Take 1 tablet by mouth Daily., Disp: 90 tablet, Rfl: 3    polyethylene glycol (GoLYTELY) 236 g solution, For constipation, drink 8 ounce glass of Golytely every 30 minutes x 4, wait 6 hours and repeat for 2nd liter if needed, wait 6 hours and repeat for 3rd liter if needed, wait 6 hours and repeat for 4 th liter., Disp: 4000 mL, Rfl: 0    promethazine (PHENERGAN) 25 MG tablet, Take 1 tablet by mouth Every 6 (Six) Hours As Needed for Nausea or Vomiting., Disp: , Rfl:     Rimegepant Sulfate (Nurtec) 75 MG tablet dispersible tablet, Take 1 tablet by mouth., Disp: , Rfl:     rimegepant sulfate ODT (Nurtec) 75 MG disintegrating tablet, Take 1 tablet by mouth Daily As Needed for migraine. max 1 tablet per 24 hours, Disp: 24 tablet, Rfl: 3    rimegepant sulfate ODT (Nurtec) 75 MG disintegrating tablet, Take 1 tablet by mouth daily as needed (Migraine)., Disp: 24 tablet, Rfl: 3    SUMAtriptan (IMITREX) 100 MG tablet, Take 1 tablet by mouth 1 (One) Time As Needed for Migraine., Disp: , Rfl:     SUMAtriptan (IMITREX) 100 MG tablet, Take 1 tablet by mouth at onset of migraine.  May repeat dose in 2 hours if needed. Max 2 tabs per 24 hours., Disp: 27 tablet, Rfl: 3    tiotropium bromide-olodaterol (Stiolto Respimat) 2.5-2.5 MCG/ACT aerosol solution inhaler, Inhale 2 puffs Daily., Disp: 4 g, Rfl: 4    ubrogepant 100 MG tablet, Take 1 tablet by mouth As Needed., Disp: , Rfl:     VENTOLIN  (90 Base) MCG/ACT inhaler, , Disp: , Rfl:   Medicines reviewed by Isabel Gale, PharmD on 6/30/2025 at 10:06 AM    Drug Interactions  none     Adverse Drug Reactions  Medication tolerability: Tolerating with no to minimal ADRs  Medication plan: Continue therapy with normal follow-up  Plan for ADR Management: None    Hospitalizations and Urgent Care Since Last Assessment  Hospitalizations or Admissions: none  ED Visits: February seen for leg swelling and April for arm pain   Urgent Office Visits: none     Adherence, Self-Administration, and Current Therapy Problems  Adherence related to the patient's specialty therapy was discussed with the patient. The Adherence segment of this outreach has been reviewed and updated.     Adherence Questions  Linked Medication(s) Assessed: Rimegepant Sulfate (Nurtec)  On average, how many doses/injections does the patient miss per month?: 0 (PRN use)  What are the identified reasons for non-adherence or missed doses? : no problems identified  What is the estimated medication adherence level?: %  Based on the patient/caregiver response and refill history, does this patient require an MTP to track adherence improvements?: no    Additional Barriers to Patient Self-Administration: none  Methods for Supporting Patient Self-Administration: N/A    Open Medication Therapy Problems  No medication therapy recommendations to display    Goals of Therapy  Goals related to the patient's specialty therapy were discussed with the patient. The Patient Goals segment of this outreach has been reviewed and updated.   Goals Addressed Today        Specialty Pharmacy General Goal       Decrease severity and duration of migraine attacks by 50%    6/30/25 Patient tolerates well, no ADRs. Works well to treat acute migraine attack.              Progress Toward Meeting Patient-Identified Goals of Therapy: On Track  New Patient-Identified Goals, If Applicable:     Progress Toward Meeting Clinical Goals or Therapeutic Targets: On Track  New Clinical Goals or Therapeutic Targets, If Applicable:     Quality of Life Assessment   Quality of Life related to the patient's enrollment in the patient management program and services provided was discussed with the patient. The QOL segment of this outreach has been reviewed and updated.  Quality of Life Improvement Scale: 8-Moderately better    Reassessment Plan & Follow-Up  Medication Therapy Changes: none  Additional Plans, Therapy Recommendations, or Therapy Problems to Be Addressed: none   Pharmacist to perform regular reassessments no more than (6) months from the previous assessment.  Care Coordinator to set up future refill outreaches, coordinate prescription delivery, and escalate clinical questions to pharmacist.     Attestation  I attest the patient was actively involved in and has agreed to the above plan of care. I attest that the specialty medication(s) addressed above are appropriate for the patient based on my reassessment. If the prescribed therapy is at any point deemed not appropriate based on the current or future assessments, a consultation will be initiated with the patient's specialty care provider to determine the best course of action. The revised plan of therapy will be documented along with any required assessments and/or additional patient education provided.     Electronically signed by Isabel Gale PharmD, 06/30/25, 10:07 AM EDT.

## 2025-07-10 ENCOUNTER — SPECIALTY PHARMACY (OUTPATIENT)
Dept: PHARMACY | Facility: TELEHEALTH | Age: 50
End: 2025-07-10
Payer: COMMERCIAL

## 2025-07-10 NOTE — PROGRESS NOTES
"   Specialty Pharmacy Patient Management Program  Refill Outreach     Radha \"Mona\" was contacted today regarding refills of their medication(s).    Refill Questions      Flowsheet Row Most Recent Value   Changes to allergies? No   Changes to medications? No   New conditions or infections since last clinic visit No   Unplanned office visit, urgent care, ED, or hospital admission in the last 4 weeks  No   How does patient/caregiver feel medication is working? Very good   Financial problems or insurance changes  No   Since the previous refill, were any specialty medication doses or scheduled injections missed or delayed?  No   Does this patient require a clinical escalation to a pharmacist? No            Delivery Questions      Flowsheet Row Most Recent Value   Delivery method UPS   Delivery address verified with patient/caregiver? Yes   Delivery address Home   Other address preferred n/a   Number of medications in delivery 1   Medication(s) being filled and delivered Rimegepant Sulfate (Nurtec)   Doses left of specialty medications a few   Copay verified? Yes   Copay amount $0.00   Copay form of payment No copayment ($0)   Delivery Date Selection 07/11/25   Signature Required No   Do you consent to receive electronic handouts?  Yes                 Follow-up: 28 day(s)     Yahaira Travis, Pharmacy Technician  7/10/2025  11:14 EDT    "

## 2025-08-04 ENCOUNTER — SPECIALTY PHARMACY (OUTPATIENT)
Dept: PHARMACY | Facility: TELEHEALTH | Age: 50
End: 2025-08-04
Payer: COMMERCIAL